# Patient Record
Sex: MALE | Race: WHITE | Employment: FULL TIME | URBAN - METROPOLITAN AREA
[De-identification: names, ages, dates, MRNs, and addresses within clinical notes are randomized per-mention and may not be internally consistent; named-entity substitution may affect disease eponyms.]

---

## 2018-01-30 LAB
T3 FREE: 2.7
T4 FREE: 0.28
T4 FREE: 0.28
T4 TOTAL: 1.8

## 2018-02-27 DIAGNOSIS — E05.90 HYPERTHYROIDISM: ICD-10-CM

## 2018-02-27 DIAGNOSIS — E07.9 THYROID DISEASE: ICD-10-CM

## 2018-02-27 DIAGNOSIS — E04.9 GOITER: ICD-10-CM

## 2018-02-27 DIAGNOSIS — M54.5 LOW BACK PAIN, UNSPECIFIED BACK PAIN LATERALITY, UNSPECIFIED CHRONICITY, WITH SCIATICA PRESENCE UNSPECIFIED: ICD-10-CM

## 2018-02-27 DIAGNOSIS — F31.9 BIPOLAR AFFECTIVE DISORDER, REMISSION STATUS UNSPECIFIED (HCC): ICD-10-CM

## 2018-02-28 ENCOUNTER — OFFICE VISIT (OUTPATIENT)
Dept: ENDOCRINOLOGY | Age: 48
End: 2018-02-28

## 2018-02-28 VITALS
BODY MASS INDEX: 31.95 KG/M2 | HEART RATE: 80 BPM | OXYGEN SATURATION: 95 % | WEIGHT: 249 LBS | HEIGHT: 74 IN | DIASTOLIC BLOOD PRESSURE: 85 MMHG | SYSTOLIC BLOOD PRESSURE: 121 MMHG

## 2018-02-28 DIAGNOSIS — Z77.098 OCCUPATIONAL EXPOSURE TO CHEMICALS: ICD-10-CM

## 2018-02-28 DIAGNOSIS — E04.9 GOITER: Primary | ICD-10-CM

## 2018-02-28 DIAGNOSIS — E05.90 HYPERTHYROIDISM: ICD-10-CM

## 2018-02-28 DIAGNOSIS — R00.2 PALPITATIONS: ICD-10-CM

## 2018-02-28 PROBLEM — Z77.128: Status: ACTIVE | Noted: 2018-02-28

## 2018-02-28 PROCEDURE — 99244 OFF/OP CNSLTJ NEW/EST MOD 40: CPT | Performed by: INTERNAL MEDICINE

## 2018-02-28 RX ORDER — METOPROLOL SUCCINATE 50 MG/1
50 TABLET, EXTENDED RELEASE ORAL DAILY
Qty: 30 TABLET | Refills: 3 | Status: SHIPPED | OUTPATIENT
Start: 2018-02-28 | End: 2018-03-01 | Stop reason: SDUPTHER

## 2018-02-28 RX ORDER — LITHIUM CARBONATE 450 MG
450 TABLET, EXTENDED RELEASE ORAL DAILY
Refills: 2 | COMMUNITY
Start: 2018-01-11 | End: 2022-06-02 | Stop reason: ALTCHOICE

## 2018-02-28 RX ORDER — METHIMAZOLE 5 MG/1
10 TABLET ORAL 3 TIMES DAILY
Refills: 5 | COMMUNITY
Start: 2018-02-17 | End: 2018-02-28 | Stop reason: DRUGHIGH

## 2018-02-28 RX ORDER — METHIMAZOLE 5 MG/1
10 TABLET ORAL DAILY
Qty: 30 TABLET | Refills: 0 | Status: SHIPPED
Start: 2018-02-28 | End: 2018-05-29 | Stop reason: SDUPTHER

## 2018-02-28 RX ORDER — BENZTROPINE MESYLATE 2 MG/1
1 TABLET ORAL 2 TIMES DAILY
COMMUNITY
Start: 2018-02-22 | End: 2022-06-02

## 2018-02-28 RX ORDER — ZIPRASIDONE HYDROCHLORIDE 80 MG/1
80 CAPSULE ORAL DAILY
COMMUNITY
Start: 2018-02-20

## 2018-02-28 ASSESSMENT — PATIENT HEALTH QUESTIONNAIRE - PHQ9
SUM OF ALL RESPONSES TO PHQ9 QUESTIONS 1 & 2: 0
2. FEELING DOWN, DEPRESSED OR HOPELESS: 0
SUM OF ALL RESPONSES TO PHQ QUESTIONS 1-9: 0
1. LITTLE INTEREST OR PLEASURE IN DOING THINGS: 0

## 2018-02-28 NOTE — PROGRESS NOTES
Patient ID: Aleida Parsons is a 56 y.o. male    Chief Complaint: Graves disease, goiter   Referred by Dr. Elsie Woodard MD    HPI:   Aleida Parsons is here for initial evaluation of Graves disease, goiter     US Aug 2014: The right lobe of the thyroid gland measures 5.2 x 2.6 x 2.6 cm in size. The left lobe of the thyroid gland measures 5.1 x 1.9 x 1.5 cm in size. There is a hypoechoic nodule identified at the lower pole of the right lobe of thyroid gland measuring 1.2 x 1.3 x 1.6 cm in size. There is a small 9 mm x 6 mm hypoechoic nodule identified at the lower pole of the left lobe of the thyroid gland. CT neck done Jan 2018 for goiter: Thyroid gland measures 10.5 cm in maximal transverse dimension. The maximal thickness of the isthmus is 2.3 cm. The right lobe of the thyroid gland measures 9.7 x 6.2 x 5.9 cm in size. Left lobe of the thyroid gland measures 9.2 x 5.5 x 4.3 cm in size. No significant mass effect and narrowing of the airway identified. Lithium for a long time became adherent in 2015     Sep 2016, thyroid abs normal     Methimazole 10mg tid   Metoprolol 25mg     Energy levels low sometimes   Occasional palpitations roxann if miss meds   No Hair or skin changes   Lost 35 lbs in about 3-4 months - off seroquel   No heat or cold intolerance   Has depression and anxiety   Denies excessive sweating, tremors, sleep issues   No Neck pain. Some change in voice while talking for a long time   No compressive symptoms     Family history of thyroid disorder: None   No Neck radiation  Recent iodine loading in form of contrast material for diagnostic studies in Jan 2018   No Food supplements, herbs or medications including Biotin  Feb 2018:  Had upper GI symptoms     The following portions of the patient's history were reviewed and updated as appropriate:       Family History   Problem Relation Age of Onset    Arthritis Mother     Cancer Mother      breast    Cancer Father      prostate    High Blood Pressure Father     High Cholesterol Father     Vision Loss Father     Cancer Paternal Grandmother     Cancer Paternal Grandfather     Stroke Paternal Grandfather             Social History     Social History    Marital status:      Spouse name: N/A    Number of children: N/A    Years of education: N/A     Occupational History    Not on file. Social History Main Topics    Smoking status: Current Every Day Smoker     Packs/day: 0.25     Years: 19.00    Smokeless tobacco: Never Used    Alcohol use No    Drug use: Yes     Types: Marijuana    Sexual activity: Yes     Partners: Female     Other Topics Concern    Not on file     Social History Narrative    No narrative on file           Past Medical History:   Diagnosis Date    ADD (attention deficit disorder)     Back pain     [facility] [address]     B[facility] disorder ([facility] [address]     Goiter     Headache(784.0)     Hyperthyroidism     Thyroid disease          No past surgical history on file. No Known Allergies        Current Outpatient Prescriptions:     Lisdexamfetamine Dimesylate (VYVANSE) 40 MG CAPS, Take by mouth., Disp: , Rfl:     lithium (ESKALITH) 450 MG extended release tablet, TAKE 1 TABLET TWICE A DAY, Disp: , Rfl: 2    benztropine (COGENTIN) 2 MG tablet, , Disp: , Rfl:     ziprasidone (GEODON) 80 MG capsule, Per patient maybe 75 mg, Disp: , Rfl:     methimazole (TAPAZOLE) 5 MG tablet, Take 2 tablets by mouth daily, Disp: 30 tablet, Rfl: 0    metoprolol succinate (TOPROL XL) 50 MG extended release tablet, Take 1 tablet by mouth daily, Disp: 30 tablet, Rfl: 3    quetiapine (SEROQUEL) 200 MG tablet, Take 400 mg by mouth nightly., Disp: , Rfl:       Review of Systems:  Constitutional: Negative for fever, chills. HENT: Negative for congestion, ear pain, rhinorrhea,  sore throat and trouble swallowing. Eyes: Negative for photophobia, redness, itching. Respiratory: Negative for cough, shortness of breath and sputum.

## 2018-03-01 ENCOUNTER — HOSPITAL ENCOUNTER (OUTPATIENT)
Dept: ULTRASOUND IMAGING | Age: 48
Discharge: HOME | End: 2018-03-01
Admitting: INTERNAL MEDICINE

## 2018-03-01 DIAGNOSIS — R00.2 PALPITATIONS: ICD-10-CM

## 2018-03-01 DIAGNOSIS — E05.90 HYPERTHYROIDISM: ICD-10-CM

## 2018-03-01 DIAGNOSIS — E04.9 GOITER: ICD-10-CM

## 2018-03-01 RX ORDER — METOPROLOL SUCCINATE 50 MG/1
50 TABLET, EXTENDED RELEASE ORAL DAILY
Qty: 30 TABLET | Refills: 3 | Status: SHIPPED | OUTPATIENT
Start: 2018-03-01 | End: 2018-03-02 | Stop reason: SDUPTHER

## 2018-03-02 ENCOUNTER — TELEPHONE (OUTPATIENT)
Dept: ENDOCRINOLOGY | Age: 48
End: 2018-03-02

## 2018-03-02 DIAGNOSIS — R00.2 PALPITATIONS: ICD-10-CM

## 2018-03-02 DIAGNOSIS — E05.90 HYPERTHYROIDISM: ICD-10-CM

## 2018-03-02 RX ORDER — METOPROLOL SUCCINATE 50 MG/1
50 TABLET, EXTENDED RELEASE ORAL DAILY
Qty: 30 TABLET | Refills: 3 | Status: SHIPPED | OUTPATIENT
Start: 2018-03-02 | End: 2018-05-29

## 2018-03-29 ENCOUNTER — OFFICE VISIT (OUTPATIENT)
Dept: ENDOCRINOLOGY | Age: 48
End: 2018-03-29

## 2018-03-29 VITALS
SYSTOLIC BLOOD PRESSURE: 109 MMHG | WEIGHT: 257 LBS | HEIGHT: 74 IN | DIASTOLIC BLOOD PRESSURE: 73 MMHG | BODY MASS INDEX: 32.98 KG/M2 | OXYGEN SATURATION: 97 % | HEART RATE: 72 BPM

## 2018-03-29 DIAGNOSIS — E05.90 HYPERTHYROIDISM: ICD-10-CM

## 2018-03-29 DIAGNOSIS — E04.9 GOITER: ICD-10-CM

## 2018-03-29 DIAGNOSIS — E07.9 THYROID DISEASE: Primary | ICD-10-CM

## 2018-03-29 PROCEDURE — 99214 OFFICE O/P EST MOD 30 MIN: CPT | Performed by: INTERNAL MEDICINE

## 2018-03-29 ASSESSMENT — PATIENT HEALTH QUESTIONNAIRE - PHQ9
1. LITTLE INTEREST OR PLEASURE IN DOING THINGS: 0
2. FEELING DOWN, DEPRESSED OR HOPELESS: 0
SUM OF ALL RESPONSES TO PHQ QUESTIONS 1-9: 0
SUM OF ALL RESPONSES TO PHQ9 QUESTIONS 1 & 2: 0

## 2018-03-29 NOTE — PROGRESS NOTES
Arthritis Mother     Cancer Mother      breast    Cancer Father      prostate    High Blood Pressure Father     High Cholesterol Father     Vision Loss Father     Cancer Paternal Grandmother     Cancer Paternal Grandfather     Stroke Paternal Grandfather             Social History     Social History    Marital status:      Spouse name: N/A    Number of children: N/A    Years of education: N/A     Occupational History    Not on file. Social History Main Topics    Smoking status: Current Every Day Smoker     Packs/day: 0.25     Years: 19.00    Smokeless tobacco: Never Used    Alcohol use No    Drug use: Yes     Types: Marijuana    Sexual activity: Yes     Partners: Female     Other Topics Concern    Not on file     Social History Narrative    No narrative on file           Past Medical History:   Diagnosis Date    ADD (attention deficit disorder)     Back pain     [facility] disorder ([facility] [address]     Bipolar disorder ([facility] [address]     Goiter     Headache(784.0)     Hyperthyroidism     Thyroid disease          History reviewed. No pertinent surgical history. No Known Allergies        Current Outpatient Prescriptions:     metoprolol succinate (TOPROL XL) 50 MG extended release tablet, Take 1 tablet by mouth daily, Disp: 30 tablet, Rfl: 3    lithium (ESKALITH) 450 MG extended release tablet, TAKE 1 TABLET TWICE A DAY, Disp: , Rfl: 2    benztropine (COGENTIN) 2 MG tablet, nightly , Disp: , Rfl:     ziprasidone (GEODON) 80 MG capsule, Per patient maybe 75 mg, Disp: , Rfl:     methimazole (TAPAZOLE) 5 MG tablet, Take 2 tablets by mouth daily, Disp: 30 tablet, Rfl: 0    Lisdexamfetamine Dimesylate (VYVANSE) 40 MG CAPS, Take by mouth., Disp: , Rfl:       Review of Systems:  Constitutional: Negative for fever, chills. HENT: Negative for congestion, ear pain, rhinorrhea,  sore throat and trouble swallowing. Eyes: Negative for photophobia, redness, itching.    Respiratory: Negative for cough, Blanca Zurita was seen today for goiter. Diagnoses and all orders for this visit:    Thyroid disease  -     TSH without Reflex; Standing  -     T4, Free; Standing  -     T3, Free; Standing  -     CBC Auto Differential; Standing  -     Comprehensive Metabolic Panel; Standing    Hyperthyroidism  -     TSH without Reflex; Standing  -     T4, Free; Standing  -     T3, Free; Standing  -     CBC Auto Differential; Standing  -     Comprehensive Metabolic Panel; Standing    Goiter        1: Hyperthyroidism and Goiter     Jan 2018: TRAB 1.33 (<175), , , Ft4 0.28, T3 104 (normal), Tg and TPO abs normal     High TSH and low Ft4     Methimazole 10mg daily     No uptake scan at this time as he had contrast in Jan 2018     Order uptake scan on next visit     From Aug 2014 till Jan 2018, imaging showed almost double the volume of thyroid gland. It happened after he was taking lithium. So it could be lithium related. Or in rare cases it could be a lymphoma. I would recommend thyroid surgery. Alternative is radio iodine but it may not shrink completely. Risks of thyroid surgery also discussed. He wants to wait for the surgery. C/w metoprolol XL 50mg     Hyperthyroidism medication education   'Reviewed the risks of anti-thyroid drugs including agranulocytosis and hepatitis. If the patient develops a fever, sore throat, jaundice, malaise and/or anorexia, patient is to stop the medicine and call ASAP. Will do trial of methimazole for 9-12 months. If she has flare ups or continued methimazole for longer duration then will consider DURAND or surgery.     TFTs, CMP and CBC today and before next visit in 2 months       Electronically signed by Betty Bailey MD on 3/29/2018 at 4:36 PM

## 2018-03-29 NOTE — PATIENT INSTRUCTIONS
Patient Education        Radioactive Iodine: What to Expect at Home  Your Recovery  Radioactive iodine is absorbed and concentrated by the thyroid gland. You get it in liquid or pill form. The radiation will pass out of your body through your urine within days. Until that time, you will give off radiation in your sweat, your saliva, your urine, and anything else that comes out of your body. It is important to avoid exposing other people to the radioactivity from your body. Your doctor will give you more written instructions. Follow these carefully. The instructions will tell you how far to stay away from people and how long you need to follow the precautions listed below. They will list other ways to keep other people safe. They will also tell you when it will be safe to go out, go to work, and do other activities. How can you care for yourself at home? General recommendations  · For a period of time, you will need to keep your distance from other people, especially young children and pregnant women. · Avoid close contact, kissing, and sexual activity. You may need to sleep in a separate bed from your partner. · Keep the toilet very clean. Men should urinate sitting down to avoid splashing. Flush the toilet 2 or 3 times after each use. Wash your hands well with soap and lots of water each time you use the toilet. · Rinse the bathroom sink and tub well after you use them. · Use separate towels, washcloths, and sheets. Wash these and your personal clothing by themselves. Don't wash them with other people's laundry. · You may want to use a special plastic trash bag for all your trash, such as bandages, paper or plastic dishes, menstrual pads, tissues, or paper towels. Talk to your treatment facility to see if they will handle the disposal. Or after 80 days, this bag can be thrown out with your other trash. · Wash your dishes in a  or by hand.  If you use disposable dishes, they must be thrown away

## 2018-03-30 DIAGNOSIS — E07.9 THYROID DISEASE: ICD-10-CM

## 2018-03-30 DIAGNOSIS — E05.90 HYPERTHYROIDISM: ICD-10-CM

## 2018-03-30 LAB
A/G RATIO: 2.4 (ref 1.1–2.2)
ALBUMIN SERPL-MCNC: 4.1 G/DL (ref 3.4–5)
ALP BLD-CCNC: 65 U/L (ref 40–129)
ALT SERPL-CCNC: 9 U/L (ref 10–40)
ANION GAP SERPL CALCULATED.3IONS-SCNC: 9 MMOL/L (ref 3–16)
AST SERPL-CCNC: 12 U/L (ref 15–37)
BASOPHILS ABSOLUTE: 0 K/UL (ref 0–0.2)
BASOPHILS RELATIVE PERCENT: 0.3 %
BILIRUB SERPL-MCNC: 0.3 MG/DL (ref 0–1)
BUN BLDV-MCNC: 18 MG/DL (ref 7–20)
CALCIUM SERPL-MCNC: 8.8 MG/DL (ref 8.3–10.6)
CHLORIDE BLD-SCNC: 105 MMOL/L (ref 99–110)
CO2: 29 MMOL/L (ref 21–32)
CREAT SERPL-MCNC: 0.9 MG/DL (ref 0.9–1.3)
EOSINOPHILS ABSOLUTE: 0.2 K/UL (ref 0–0.6)
EOSINOPHILS RELATIVE PERCENT: 3 %
GFR AFRICAN AMERICAN: >60
GFR NON-AFRICAN AMERICAN: >60
GLOBULIN: 1.7 G/DL
GLUCOSE BLD-MCNC: 84 MG/DL (ref 70–99)
HCT VFR BLD CALC: 42 % (ref 40.5–52.5)
HEMOGLOBIN: 13.9 G/DL (ref 13.5–17.5)
LYMPHOCYTES ABSOLUTE: 1.4 K/UL (ref 1–5.1)
LYMPHOCYTES RELATIVE PERCENT: 22.9 %
MCH RBC QN AUTO: 32.4 PG (ref 26–34)
MCHC RBC AUTO-ENTMCNC: 33 G/DL (ref 31–36)
MCV RBC AUTO: 98.1 FL (ref 80–100)
MONOCYTES ABSOLUTE: 0.5 K/UL (ref 0–1.3)
MONOCYTES RELATIVE PERCENT: 8.4 %
NEUTROPHILS ABSOLUTE: 4.1 K/UL (ref 1.7–7.7)
NEUTROPHILS RELATIVE PERCENT: 65.4 %
PDW BLD-RTO: 13.8 % (ref 12.4–15.4)
PLATELET # BLD: 165 K/UL (ref 135–450)
PMV BLD AUTO: 9.1 FL (ref 5–10.5)
POTASSIUM SERPL-SCNC: 4.6 MMOL/L (ref 3.5–5.1)
RBC # BLD: 4.28 M/UL (ref 4.2–5.9)
SODIUM BLD-SCNC: 143 MMOL/L (ref 136–145)
T3 FREE: 3.6 PG/ML (ref 2.3–4.2)
T4 FREE: 0.5 NG/DL (ref 0.9–1.8)
TOTAL PROTEIN: 5.8 G/DL (ref 6.4–8.2)
TSH SERPL DL<=0.05 MIU/L-ACNC: 13.62 UIU/ML (ref 0.27–4.2)
WBC # BLD: 6.3 K/UL (ref 4–11)

## 2018-05-25 DIAGNOSIS — E05.90 HYPERTHYROIDISM: ICD-10-CM

## 2018-05-25 DIAGNOSIS — E07.9 THYROID DISEASE: ICD-10-CM

## 2018-05-25 LAB
A/G RATIO: 2.4 (ref 1.1–2.2)
ALBUMIN SERPL-MCNC: 4.3 G/DL (ref 3.4–5)
ALP BLD-CCNC: 67 U/L (ref 40–129)
ALT SERPL-CCNC: 8 U/L (ref 10–40)
ANION GAP SERPL CALCULATED.3IONS-SCNC: 11 MMOL/L (ref 3–16)
AST SERPL-CCNC: 11 U/L (ref 15–37)
BASOPHILS ABSOLUTE: 0 K/UL (ref 0–0.2)
BASOPHILS RELATIVE PERCENT: 0.4 %
BILIRUB SERPL-MCNC: 0.7 MG/DL (ref 0–1)
BUN BLDV-MCNC: 16 MG/DL (ref 7–20)
CALCIUM SERPL-MCNC: 9.3 MG/DL (ref 8.3–10.6)
CHLORIDE BLD-SCNC: 104 MMOL/L (ref 99–110)
CO2: 27 MMOL/L (ref 21–32)
CREAT SERPL-MCNC: 0.9 MG/DL (ref 0.9–1.3)
EOSINOPHILS ABSOLUTE: 0.2 K/UL (ref 0–0.6)
EOSINOPHILS RELATIVE PERCENT: 3.6 %
GFR AFRICAN AMERICAN: >60
GFR NON-AFRICAN AMERICAN: >60
GLOBULIN: 1.8 G/DL
GLUCOSE BLD-MCNC: 109 MG/DL (ref 70–99)
HCT VFR BLD CALC: 44.9 % (ref 40.5–52.5)
HEMOGLOBIN: 15.1 G/DL (ref 13.5–17.5)
LYMPHOCYTES ABSOLUTE: 1.7 K/UL (ref 1–5.1)
LYMPHOCYTES RELATIVE PERCENT: 26.6 %
MCH RBC QN AUTO: 31.9 PG (ref 26–34)
MCHC RBC AUTO-ENTMCNC: 33.7 G/DL (ref 31–36)
MCV RBC AUTO: 94.7 FL (ref 80–100)
MONOCYTES ABSOLUTE: 0.5 K/UL (ref 0–1.3)
MONOCYTES RELATIVE PERCENT: 7.4 %
NEUTROPHILS ABSOLUTE: 4 K/UL (ref 1.7–7.7)
NEUTROPHILS RELATIVE PERCENT: 62 %
PDW BLD-RTO: 13.2 % (ref 12.4–15.4)
PLATELET # BLD: 164 K/UL (ref 135–450)
PMV BLD AUTO: 8.8 FL (ref 5–10.5)
POTASSIUM SERPL-SCNC: 4.8 MMOL/L (ref 3.5–5.1)
RBC # BLD: 4.74 M/UL (ref 4.2–5.9)
SODIUM BLD-SCNC: 142 MMOL/L (ref 136–145)
T3 FREE: 3.2 PG/ML (ref 2.3–4.2)
T4 FREE: 1.1 NG/DL (ref 0.9–1.8)
TOTAL PROTEIN: 6.1 G/DL (ref 6.4–8.2)
TSH SERPL DL<=0.05 MIU/L-ACNC: 3.57 UIU/ML (ref 0.27–4.2)
WBC # BLD: 6.5 K/UL (ref 4–11)

## 2018-05-29 ENCOUNTER — OFFICE VISIT (OUTPATIENT)
Dept: ENDOCRINOLOGY | Age: 48
End: 2018-05-29

## 2018-05-29 VITALS
OXYGEN SATURATION: 96 % | HEIGHT: 74 IN | DIASTOLIC BLOOD PRESSURE: 80 MMHG | HEART RATE: 60 BPM | BODY MASS INDEX: 30.8 KG/M2 | SYSTOLIC BLOOD PRESSURE: 120 MMHG | WEIGHT: 240 LBS

## 2018-05-29 DIAGNOSIS — E05.90 HYPERTHYROIDISM: ICD-10-CM

## 2018-05-29 DIAGNOSIS — E04.9 GOITER: Primary | ICD-10-CM

## 2018-05-29 PROCEDURE — 99214 OFFICE O/P EST MOD 30 MIN: CPT | Performed by: INTERNAL MEDICINE

## 2018-05-29 RX ORDER — METHIMAZOLE 5 MG/1
5 TABLET ORAL DAILY
Qty: 90 TABLET | Refills: 1 | Status: SHIPPED | OUTPATIENT
Start: 2018-05-29 | End: 2019-03-28 | Stop reason: SDUPTHER

## 2018-05-29 RX ORDER — METOPROLOL SUCCINATE 25 MG/1
25 TABLET, EXTENDED RELEASE ORAL DAILY
Qty: 90 TABLET | Refills: 0 | Status: SHIPPED | OUTPATIENT
Start: 2018-05-29 | End: 2018-08-25 | Stop reason: SDUPTHER

## 2018-05-29 ASSESSMENT — PATIENT HEALTH QUESTIONNAIRE - PHQ9
SUM OF ALL RESPONSES TO PHQ9 QUESTIONS 1 & 2: 0
SUM OF ALL RESPONSES TO PHQ QUESTIONS 1-9: 0
1. LITTLE INTEREST OR PLEASURE IN DOING THINGS: 0
2. FEELING DOWN, DEPRESSED OR HOPELESS: 0

## 2018-08-25 DIAGNOSIS — E05.90 HYPERTHYROIDISM: ICD-10-CM

## 2018-08-25 DIAGNOSIS — E04.9 GOITER: ICD-10-CM

## 2018-08-27 DIAGNOSIS — E04.9 GOITER: ICD-10-CM

## 2018-08-27 DIAGNOSIS — E05.90 HYPERTHYROIDISM: ICD-10-CM

## 2018-08-27 LAB
ALBUMIN SERPL-MCNC: 4.2 G/DL (ref 3.4–5)
ALP BLD-CCNC: 68 U/L (ref 40–129)
ALT SERPL-CCNC: 7 U/L (ref 10–40)
AST SERPL-CCNC: 9 U/L (ref 15–37)
BASOPHILS ABSOLUTE: 0 K/UL (ref 0–0.2)
BASOPHILS RELATIVE PERCENT: 0.5 %
BILIRUB SERPL-MCNC: 0.4 MG/DL (ref 0–1)
BILIRUBIN DIRECT: <0.2 MG/DL (ref 0–0.3)
BILIRUBIN, INDIRECT: ABNORMAL MG/DL (ref 0–1)
EOSINOPHILS ABSOLUTE: 0.1 K/UL (ref 0–0.6)
EOSINOPHILS RELATIVE PERCENT: 2.2 %
HCT VFR BLD CALC: 43.3 % (ref 40.5–52.5)
HEMOGLOBIN: 14.3 G/DL (ref 13.5–17.5)
LYMPHOCYTES ABSOLUTE: 1 K/UL (ref 1–5.1)
LYMPHOCYTES RELATIVE PERCENT: 17.5 %
MCH RBC QN AUTO: 31.4 PG (ref 26–34)
MCHC RBC AUTO-ENTMCNC: 33.1 G/DL (ref 31–36)
MCV RBC AUTO: 94.9 FL (ref 80–100)
MONOCYTES ABSOLUTE: 0.3 K/UL (ref 0–1.3)
MONOCYTES RELATIVE PERCENT: 6 %
NEUTROPHILS ABSOLUTE: 4.1 K/UL (ref 1.7–7.7)
NEUTROPHILS RELATIVE PERCENT: 73.8 %
PDW BLD-RTO: 13.9 % (ref 12.4–15.4)
PLATELET # BLD: 171 K/UL (ref 135–450)
PMV BLD AUTO: 8.8 FL (ref 5–10.5)
RBC # BLD: 4.56 M/UL (ref 4.2–5.9)
T3 FREE: 3.1 PG/ML (ref 2.3–4.2)
T4 FREE: 1 NG/DL (ref 0.9–1.8)
TOTAL PROTEIN: 5.9 G/DL (ref 6.4–8.2)
TSH SERPL DL<=0.05 MIU/L-ACNC: 4.94 UIU/ML (ref 0.27–4.2)
WBC # BLD: 5.6 K/UL (ref 4–11)

## 2018-08-27 RX ORDER — METOPROLOL SUCCINATE 25 MG/1
TABLET, EXTENDED RELEASE ORAL
Qty: 90 TABLET | Refills: 0 | Status: SHIPPED | OUTPATIENT
Start: 2018-08-27 | End: 2018-08-29

## 2018-08-29 ENCOUNTER — OFFICE VISIT (OUTPATIENT)
Dept: ENDOCRINOLOGY | Age: 48
End: 2018-08-29

## 2018-08-29 VITALS
BODY MASS INDEX: 30.67 KG/M2 | OXYGEN SATURATION: 96 % | HEIGHT: 74 IN | SYSTOLIC BLOOD PRESSURE: 131 MMHG | DIASTOLIC BLOOD PRESSURE: 87 MMHG | WEIGHT: 239 LBS | HEART RATE: 70 BPM

## 2018-08-29 DIAGNOSIS — E05.90 HYPERTHYROIDISM: Primary | ICD-10-CM

## 2018-08-29 DIAGNOSIS — E04.9 GOITER: ICD-10-CM

## 2018-08-29 PROCEDURE — 99214 OFFICE O/P EST MOD 30 MIN: CPT | Performed by: INTERNAL MEDICINE

## 2018-08-29 ASSESSMENT — PATIENT HEALTH QUESTIONNAIRE - PHQ9
SUM OF ALL RESPONSES TO PHQ QUESTIONS 1-9: 0
2. FEELING DOWN, DEPRESSED OR HOPELESS: 0
SUM OF ALL RESPONSES TO PHQ9 QUESTIONS 1 & 2: 0
SUM OF ALL RESPONSES TO PHQ QUESTIONS 1-9: 0
1. LITTLE INTEREST OR PLEASURE IN DOING THINGS: 0

## 2018-08-29 NOTE — PATIENT INSTRUCTIONS
Patient Education        Thyroid Nodules: Care Instructions  Your Care Instructions  Thyroid nodules are growths or lumps in the thyroid gland. Your thyroid is in the front of your neck. It controls how your body uses energy. You may have tests to see if the nodule is caused by cancer. Most nodules aren't cancer and don't cause problems. Many don't even need treatment. If you do have cancer, it can usually be cured. Treatment will probably include surgery. You may also get radioactive iodine treatment. If your thyroid can't make thyroid hormone after treatment, you can take a pill every day to replace the hormone. Follow-up care is a key part of your treatment and safety. Be sure to make and go to all appointments, and call your doctor if you are having problems. It's also a good idea to know your test results and keep a list of the medicines you take. How can you care for yourself at home? · Be safe with medicines. If you take thyroid hormone medicine:  ¨ Take it exactly as prescribed. Call your doctor if you think you are having a problem with your medicine. If you take the right amount and don't skip doses, you probably won't have side effects. ¨ Do not take it with calcium, vitamins, or iron. ¨ Try not to miss a dose. ¨ Do not take extra doses. This will not help you get better any faster. It may also cause side effects. ¨ Tell your doctor about any medicines you take. This includes over-the-counter medicines. ¨ Wear a medical alert bracelet or necklace that says you take thyroid hormones. You can buy these at most drugstores. When should you call for help? Call 911 anytime you think you may need emergency care.  For example, call if:    · You lose consciousness.    Call your doctor now or seek immediate medical care if:    · You have shortness of breath.    Watch closely for changes in your health, and be sure to contact your doctor if:    · You have pain in your neck, jaw, or ear.     · You have

## 2018-08-29 NOTE — PROGRESS NOTES
Patient ID: Efe Kim is a 53 y.o. male    Chief Complaint: Graves disease, goiter     HPI:   Efe Kim is here for an evaluation of Graves disease, goiter     US Aug 2014: The right lobe of the thyroid gland measures 5.2 x 2.6 x 2.6 cm in size. The left lobe of the thyroid gland measures 5.1 x 1.9 x 1.5 cm in size. There is a hypoechoic nodule identified at the lower pole of the right lobe of thyroid gland measuring 1.2 x 1.3 x 1.6 cm in size. There is a small 9 mm x 6 mm hypoechoic nodule identified at the lower pole of the left lobe of the thyroid gland. CT neck done Jan 2018 for goiter: Thyroid gland measures 10.5 cm in maximal transverse dimension. The maximal thickness of the isthmus is 2.3 cm. The right lobe of the thyroid gland measures 9.7 x 6.2 x 5.9 cm in size. Left lobe of the thyroid gland measures 9.2 x 5.5 x 4.3 cm in size. No significant mass effect and narrowing of the airway identified. US March 2018: Right thyroid lobe: 6.6 x 5.3 x 8.2 cm   Left thyroid lobe:  4.9 x 5.1 x 7.8 cm   Isthmus: 1.6 cm  No nodules. Lithium for a long time became adherent in 2015     Sep 2016, thyroid abs normal   , N <55    Methimazole 5mg daily   Metoprolol ER 25mg daily      Energy levels LOW   No palpitations    No Hair or skin changes   Weight stable   Usually warm   Has depression and anxiety   Denies excessive sweating, tremors, sleep issues   No Neck pain. Some change in voice while talking for a long time   No compressive symptoms     Family history of thyroid disorder: None   No Neck radiation  Recent iodine loading in form of contrast material for diagnostic studies in Jan 2018   No Food supplements, herbs or medications including Biotin  Feb 2018:  Had upper GI symptoms     The following portions of the patient's history were reviewed and updated as appropriate:       Family History   Problem Relation Age of Onset    Arthritis Mother     Cancer Mother         breast    Cancer Father         prostate    High Blood Pressure Father     High Cholesterol Father     Vision Loss Father     Cancer Paternal Grandmother     Cancer Paternal Grandfather     Stroke Paternal Grandfather             Social History     Social History    Marital status:      Spouse name: N/A    Number of children: N/A    Years of education: N/A     Occupational History    Not on file. Social History Main Topics    Smoking status: Current Every Day Smoker     Packs/day: 0.25     Years: 19.00    Smokeless tobacco: Never Used    Alcohol use No    Drug use: Yes     Types: Marijuana    Sexual activity: Yes     Partners: Female     Other Topics Concern    Not on file     Social History Narrative    No narrative on file           Past Medical History:   Diagnosis Date    ADD (attention deficit disorder)     Back pain     [facility] ([facility] [address]     Bipolar disorder ([facility] [address]     Goiter     Headache(784.0)     Hyperthyroidism     Thyroid disease          No past surgical history on file. No Known Allergies        Current Outpatient Prescriptions:     methimazole (TAPAZOLE) 5 MG tablet, Take 1 tablet by mouth daily, Disp: 90 tablet, Rfl: 1    Lisdexamfetamine Dimesylate (VYVANSE) 40 MG CAPS, Take by mouth., Disp: , Rfl:     lithium (ESKALITH) 450 MG extended release tablet, TAKE 1 TABLET TWICE A DAY, Disp: , Rfl: 2    benztropine (COGENTIN) 2 MG tablet, nightly , Disp: , Rfl:     ziprasidone (GEODON) 80 MG capsule, Per patient maybe 75 mg, Disp: , Rfl:       Review of Systems:  Constitutional: Negative for fever, chills. HENT: Negative for congestion, ear pain, rhinorrhea,  sore throat and trouble swallowing. Eyes: Negative for photophobia, redness, itching. Respiratory: Negative for cough, shortness of breath and sputum. Cardiovascular: Negative for chest pain and leg swelling. Gastrointestinal: Negative for nausea, vomiting, abdominal pain, diarrhea, constipation.    Endocrine: Negative for polydipsia, polyphagia and polyuria. Genitourinary: Negative for dysuria, urgency, frequency, hematuria and flank pain. Musculoskeletal: Negative for myalgias, back pain, arthralgias and neck pain. Skin/Nail: Negative for rash, itching. Normal nails. Neurological: Negative for seizures, light-headedness, numbness and headaches. Hematological/ Lymph nodes: Negative for adenopathy. Does not bruise/bleed easily. Psychiatric/Behavioral: Negative for suicidal ideas and decreased concentration. Physical Exam:  /87 (Site: Left Arm, Position: Sitting, Cuff Size: Large Adult)   Pulse 70   Ht 6' 2\" (1.88 m)   Wt 239 lb (108.4 kg)   SpO2 96%   BMI 30.69 kg/m²   Constitutional: Patient is oriented to person, place, and time. Patient appears well-developed and well-nourished. HENT:    Head: Normocephalic and atraumatic. Eyes: Conjunctivae and EOM are normal. Pupils are equal, round, and reactive to light. Neck: Normal range of motion. Thyroid enlarged more on right as compared to left. Stable. Cardiovascular: Normal rate, regular rhythm and normal heart sounds. Pulmonary/Chest: Effort normal and breath sounds normal.   Abdominal: Soft. Bowel sounds are normal.   Musculoskeletal: Normal range of motion. Neurological: Patient is alert and oriented to person, place, and time. Patient has normal reflexes. Skin: Skin is warm and dry. Psychiatric: Patient has a normal mood and affect.  Patient behavior is normal.     Lab Review:    Orders Only on 08/27/2018   Component Date Value Ref Range Status    Total Protein 08/27/2018 5.9* 6.4 - 8.2 g/dL Final    Alb 08/27/2018 4.2  3.4 - 5.0 g/dL Final    Alkaline Phosphatase 08/27/2018 68  40 - 129 U/L Final    ALT 08/27/2018 7* 10 - 40 U/L Final    AST 08/27/2018 9* 15 - 37 U/L Final    Total Bilirubin 08/27/2018 0.4  0.0 - 1.0 mg/dL Final    Bilirubin, Direct 08/27/2018 <0.2  0.0 - 0.3 mg/dL Final    Bilirubin, Indirect Monocytes % 05/25/2018 7.4  % Final    Eosinophils % 05/25/2018 3.6  % Final    Basophils % 05/25/2018 0.4  % Final    Neutrophils # 05/25/2018 4.0  1.7 - 7.7 K/uL Final    Lymphocytes # 05/25/2018 1.7  1.0 - 5.1 K/uL Final    Monocytes # 05/25/2018 0.5  0.0 - 1.3 K/uL Final    Eosinophils # 05/25/2018 0.2  0.0 - 0.6 K/uL Final    Basophils # 05/25/2018 0.0  0.0 - 0.2 K/uL Final    Sodium 05/25/2018 142  136 - 145 mmol/L Final    Potassium 05/25/2018 4.8  3.5 - 5.1 mmol/L Final    Chloride 05/25/2018 104  99 - 110 mmol/L Final    CO2 05/25/2018 27  21 - 32 mmol/L Final    Anion Gap 05/25/2018 11  3 - 16 Final    Glucose 05/25/2018 109* 70 - 99 mg/dL Final    BUN 05/25/2018 16  7 - 20 mg/dL Final    CREATININE 05/25/2018 0.9  0.9 - 1.3 mg/dL Final    GFR Non- 05/25/2018 >60  >60 Final    GFR  05/25/2018 >60  >60 Final    Calcium 05/25/2018 9.3  8.3 - 10.6 mg/dL Final    Total Protein 05/25/2018 6.1* 6.4 - 8.2 g/dL Final    Alb 05/25/2018 4.3  3.4 - 5.0 g/dL Final    Albumin/Globulin Ratio 05/25/2018 2.4* 1.1 - 2.2 Final    Total Bilirubin 05/25/2018 0.7  0.0 - 1.0 mg/dL Final    Alkaline Phosphatase 05/25/2018 67  40 - 129 U/L Final    ALT 05/25/2018 8* 10 - 40 U/L Final    AST 05/25/2018 11* 15 - 37 U/L Final    Globulin 05/25/2018 1.8  g/dL Final   Orders Only on 03/30/2018   Component Date Value Ref Range Status    TSH 03/30/2018 13.62* 0.27 - 4.20 uIU/mL Final    T4 Free 03/30/2018 0.5* 0.9 - 1.8 ng/dL Final    T3, Free 03/30/2018 3.6  2.3 - 4.2 pg/mL Final    WBC 03/30/2018 6.3  4.0 - 11.0 K/uL Final    RBC 03/30/2018 4.28  4.20 - 5.90 M/uL Final    Hemoglobin 03/30/2018 13.9  13.5 - 17.5 g/dL Final    Hematocrit 03/30/2018 42.0  40.5 - 52.5 % Final    MCV 03/30/2018 98.1  80.0 - 100.0 fL Final    MCH 03/30/2018 32.4  26.0 - 34.0 pg Final    MCHC 03/30/2018 33.0  31.0 - 36.0 g/dL Final    RDW 03/30/2018 13.8  12.4 - 15.4 % Final    Platelets 03/30/2018 165  135 - 450 K/uL Final    MPV 03/30/2018 9.1  5.0 - 10.5 fL Final    Neutrophils % 03/30/2018 65.4  % Final    Lymphocytes % 03/30/2018 22.9  % Final    Monocytes % 03/30/2018 8.4  % Final    Eosinophils % 03/30/2018 3.0  % Final    Basophils % 03/30/2018 0.3  % Final    Neutrophils # 03/30/2018 4.1  1.7 - 7.7 K/uL Final    Lymphocytes # 03/30/2018 1.4  1.0 - 5.1 K/uL Final    Monocytes # 03/30/2018 0.5  0.0 - 1.3 K/uL Final    Eosinophils # 03/30/2018 0.2  0.0 - 0.6 K/uL Final    Basophils # 03/30/2018 0.0  0.0 - 0.2 K/uL Final    Sodium 03/30/2018 143  136 - 145 mmol/L Final    Potassium 03/30/2018 4.6  3.5 - 5.1 mmol/L Final    Chloride 03/30/2018 105  99 - 110 mmol/L Final    CO2 03/30/2018 29  21 - 32 mmol/L Final    Anion Gap 03/30/2018 9  3 - 16 Final    Glucose 03/30/2018 84  70 - 99 mg/dL Final    BUN 03/30/2018 18  7 - 20 mg/dL Final    CREATININE 03/30/2018 0.9  0.9 - 1.3 mg/dL Final    GFR Non- 03/30/2018 >60  >60 Final    GFR  03/30/2018 >60  >60 Final    Calcium 03/30/2018 8.8  8.3 - 10.6 mg/dL Final    Total Protein 03/30/2018 5.8* 6.4 - 8.2 g/dL Final    Alb 03/30/2018 4.1  3.4 - 5.0 g/dL Final    Albumin/Globulin Ratio 03/30/2018 2.4* 1.1 - 2.2 Final    Total Bilirubin 03/30/2018 0.3  0.0 - 1.0 mg/dL Final    Alkaline Phosphatase 03/30/2018 65  40 - 129 U/L Final    ALT 03/30/2018 9* 10 - 40 U/L Final    AST 03/30/2018 12* 15 - 37 U/L Final    Globulin 03/30/2018 1.7  g/dL Final   Abstract on 02/27/2018   Component Date Value Ref Range Status    T4 Free 01/30/2018 0.28   Final    T4 Free 01/30/2018 0.28   Final    T4, Total 01/30/2018 1.8   Final    T3, Free 01/30/2018 2.7   Final        No results found. Assessment/Plan:     Victor Manuel Jimenes was seen today for follow-up. Diagnoses and all orders for this visit:    Hyperthyroidism  -     TSH without Reflex;  Future  -     T4, Free; Future  -     T3, Free; Future  -     CBC

## 2018-12-03 DIAGNOSIS — E05.90 HYPERTHYROIDISM: ICD-10-CM

## 2018-12-03 DIAGNOSIS — E04.9 GOITER: ICD-10-CM

## 2018-12-03 LAB
A/G RATIO: 2.3 (ref 1.1–2.2)
ALBUMIN SERPL-MCNC: 4.2 G/DL (ref 3.4–5)
ALP BLD-CCNC: 73 U/L (ref 40–129)
ALT SERPL-CCNC: 8 U/L (ref 10–40)
ANION GAP SERPL CALCULATED.3IONS-SCNC: 13 MMOL/L (ref 3–16)
AST SERPL-CCNC: 10 U/L (ref 15–37)
BASOPHILS ABSOLUTE: 0 K/UL (ref 0–0.2)
BASOPHILS RELATIVE PERCENT: 0.5 %
BILIRUB SERPL-MCNC: 0.5 MG/DL (ref 0–1)
BUN BLDV-MCNC: 15 MG/DL (ref 7–20)
CALCIUM SERPL-MCNC: 9.4 MG/DL (ref 8.3–10.6)
CHLORIDE BLD-SCNC: 106 MMOL/L (ref 99–110)
CO2: 24 MMOL/L (ref 21–32)
CREAT SERPL-MCNC: 1.1 MG/DL (ref 0.9–1.3)
EOSINOPHILS ABSOLUTE: 0.1 K/UL (ref 0–0.6)
EOSINOPHILS RELATIVE PERCENT: 1.6 %
GFR AFRICAN AMERICAN: >60
GFR NON-AFRICAN AMERICAN: >60
GLOBULIN: 1.8 G/DL
GLUCOSE BLD-MCNC: 116 MG/DL (ref 70–99)
HCT VFR BLD CALC: 46 % (ref 40.5–52.5)
HEMOGLOBIN: 14.8 G/DL (ref 13.5–17.5)
LYMPHOCYTES ABSOLUTE: 0.9 K/UL (ref 1–5.1)
LYMPHOCYTES RELATIVE PERCENT: 10.8 %
MCH RBC QN AUTO: 31.4 PG (ref 26–34)
MCHC RBC AUTO-ENTMCNC: 32.2 G/DL (ref 31–36)
MCV RBC AUTO: 97.6 FL (ref 80–100)
MONOCYTES ABSOLUTE: 0.6 K/UL (ref 0–1.3)
MONOCYTES RELATIVE PERCENT: 6.7 %
NEUTROPHILS ABSOLUTE: 6.8 K/UL (ref 1.7–7.7)
NEUTROPHILS RELATIVE PERCENT: 80.4 %
PDW BLD-RTO: 13.8 % (ref 12.4–15.4)
PLATELET # BLD: 168 K/UL (ref 135–450)
PMV BLD AUTO: 9.1 FL (ref 5–10.5)
POTASSIUM SERPL-SCNC: 4.6 MMOL/L (ref 3.5–5.1)
RBC # BLD: 4.71 M/UL (ref 4.2–5.9)
SODIUM BLD-SCNC: 143 MMOL/L (ref 136–145)
T3 FREE: 3 PG/ML (ref 2.3–4.2)
T4 FREE: 1.1 NG/DL (ref 0.9–1.8)
TOTAL PROTEIN: 6 G/DL (ref 6.4–8.2)
TSH SERPL DL<=0.05 MIU/L-ACNC: 3.21 UIU/ML (ref 0.27–4.2)
WBC # BLD: 8.4 K/UL (ref 4–11)

## 2018-12-06 ENCOUNTER — OFFICE VISIT (OUTPATIENT)
Dept: ENDOCRINOLOGY | Age: 48
End: 2018-12-06
Payer: COMMERCIAL

## 2018-12-06 VITALS
BODY MASS INDEX: 30.16 KG/M2 | DIASTOLIC BLOOD PRESSURE: 90 MMHG | HEART RATE: 82 BPM | OXYGEN SATURATION: 97 % | SYSTOLIC BLOOD PRESSURE: 143 MMHG | WEIGHT: 235 LBS | HEIGHT: 74 IN

## 2018-12-06 DIAGNOSIS — E05.90 HYPERTHYROIDISM: Primary | ICD-10-CM

## 2018-12-06 DIAGNOSIS — E04.9 GOITER: ICD-10-CM

## 2018-12-06 PROBLEM — R00.2 PALPITATIONS: Status: RESOLVED | Noted: 2018-02-28 | Resolved: 2018-12-06

## 2018-12-06 PROCEDURE — 99213 OFFICE O/P EST LOW 20 MIN: CPT | Performed by: INTERNAL MEDICINE

## 2018-12-06 NOTE — PROGRESS NOTES
4.2 pg/mL Final    WBC 12/03/2018 8.4  4.0 - 11.0 K/uL Final    RBC 12/03/2018 4.71  4.20 - 5.90 M/uL Final    Hemoglobin 12/03/2018 14.8  13.5 - 17.5 g/dL Final    Hematocrit 12/03/2018 46.0  40.5 - 52.5 % Final    MCV 12/03/2018 97.6  80.0 - 100.0 fL Final    MCH 12/03/2018 31.4  26.0 - 34.0 pg Final    MCHC 12/03/2018 32.2  31.0 - 36.0 g/dL Final    RDW 12/03/2018 13.8  12.4 - 15.4 % Final    Platelets 38/63/9059 168  135 - 450 K/uL Final    MPV 12/03/2018 9.1  5.0 - 10.5 fL Final    Neutrophils % 12/03/2018 80.4  % Final    Lymphocytes % 12/03/2018 10.8  % Final    Monocytes % 12/03/2018 6.7  % Final    Eosinophils % 12/03/2018 1.6  % Final    Basophils % 12/03/2018 0.5  % Final    Neutrophils # 12/03/2018 6.8  1.7 - 7.7 K/uL Final    Lymphocytes # 12/03/2018 0.9* 1.0 - 5.1 K/uL Final    Monocytes # 12/03/2018 0.6  0.0 - 1.3 K/uL Final    Eosinophils # 12/03/2018 0.1  0.0 - 0.6 K/uL Final    Basophils # 12/03/2018 0.0  0.0 - 0.2 K/uL Final    Sodium 12/03/2018 143  136 - 145 mmol/L Final    Potassium 12/03/2018 4.6  3.5 - 5.1 mmol/L Final    Chloride 12/03/2018 106  99 - 110 mmol/L Final    CO2 12/03/2018 24  21 - 32 mmol/L Final    Anion Gap 12/03/2018 13  3 - 16 Final    Glucose 12/03/2018 116* 70 - 99 mg/dL Final    BUN 12/03/2018 15  7 - 20 mg/dL Final    CREATININE 12/03/2018 1.1  0.9 - 1.3 mg/dL Final    GFR Non- 12/03/2018 >60  >60 Final    GFR  12/03/2018 >60  >60 Final    Calcium 12/03/2018 9.4  8.3 - 10.6 mg/dL Final    Total Protein 12/03/2018 6.0* 6.4 - 8.2 g/dL Final    Alb 12/03/2018 4.2  3.4 - 5.0 g/dL Final    Albumin/Globulin Ratio 12/03/2018 2.3* 1.1 - 2.2 Final    Total Bilirubin 12/03/2018 0.5  0.0 - 1.0 mg/dL Final    Alkaline Phosphatase 12/03/2018 73  40 - 129 U/L Final    ALT 12/03/2018 8* 10 - 40 U/L Final    AST 12/03/2018 10* 15 - 37 U/L Final    Globulin 12/03/2018 1.8  g/dL Final   Orders Only on 08/27/2018  Hematocrit 05/25/2018 44.9  40.5 - 52.5 % Final    MCV 05/25/2018 94.7  80.0 - 100.0 fL Final    MCH 05/25/2018 31.9  26.0 - 34.0 pg Final    MCHC 05/25/2018 33.7  31.0 - 36.0 g/dL Final    RDW 05/25/2018 13.2  12.4 - 15.4 % Final    Platelets 90/13/4993 164  135 - 450 K/uL Final    MPV 05/25/2018 8.8  5.0 - 10.5 fL Final    Neutrophils % 05/25/2018 62.0  % Final    Lymphocytes % 05/25/2018 26.6  % Final    Monocytes % 05/25/2018 7.4  % Final    Eosinophils % 05/25/2018 3.6  % Final    Basophils % 05/25/2018 0.4  % Final    Neutrophils # 05/25/2018 4.0  1.7 - 7.7 K/uL Final    Lymphocytes # 05/25/2018 1.7  1.0 - 5.1 K/uL Final    Monocytes # 05/25/2018 0.5  0.0 - 1.3 K/uL Final    Eosinophils # 05/25/2018 0.2  0.0 - 0.6 K/uL Final    Basophils # 05/25/2018 0.0  0.0 - 0.2 K/uL Final    Sodium 05/25/2018 142  136 - 145 mmol/L Final    Potassium 05/25/2018 4.8  3.5 - 5.1 mmol/L Final    Chloride 05/25/2018 104  99 - 110 mmol/L Final    CO2 05/25/2018 27  21 - 32 mmol/L Final    Anion Gap 05/25/2018 11  3 - 16 Final    Glucose 05/25/2018 109* 70 - 99 mg/dL Final    BUN 05/25/2018 16  7 - 20 mg/dL Final    CREATININE 05/25/2018 0.9  0.9 - 1.3 mg/dL Final    GFR Non- 05/25/2018 >60  >60 Final    GFR  05/25/2018 >60  >60 Final    Calcium 05/25/2018 9.3  8.3 - 10.6 mg/dL Final    Total Protein 05/25/2018 6.1* 6.4 - 8.2 g/dL Final    Alb 05/25/2018 4.3  3.4 - 5.0 g/dL Final    Albumin/Globulin Ratio 05/25/2018 2.4* 1.1 - 2.2 Final    Total Bilirubin 05/25/2018 0.7  0.0 - 1.0 mg/dL Final    Alkaline Phosphatase 05/25/2018 67  40 - 129 U/L Final    ALT 05/25/2018 8* 10 - 40 U/L Final    AST 05/25/2018 11* 15 - 37 U/L Final    Globulin 05/25/2018 1.8  g/dL Final   Orders Only on 03/30/2018   Component Date Value Ref Range Status    TSH 03/30/2018 13.62* 0.27 - 4.20 uIU/mL Final    T4 Free 03/30/2018 0.5* 0.9 - 1.8 ng/dL Final    T3, Free 03/30/2018 3.6

## 2019-03-28 DIAGNOSIS — E05.90 HYPERTHYROIDISM: ICD-10-CM

## 2019-03-28 DIAGNOSIS — E04.9 GOITER: ICD-10-CM

## 2019-03-28 RX ORDER — METHIMAZOLE 5 MG/1
TABLET ORAL
Qty: 90 TABLET | Refills: 0 | Status: SHIPPED | OUTPATIENT
Start: 2019-03-28 | End: 2019-06-27 | Stop reason: SDUPTHER

## 2019-04-09 DIAGNOSIS — E05.90 HYPERTHYROIDISM: ICD-10-CM

## 2019-04-09 LAB
T3 FREE: 3.8 PG/ML (ref 2.3–4.2)
T4 FREE: 1.5 NG/DL (ref 0.9–1.8)
TSH SERPL DL<=0.05 MIU/L-ACNC: 1.8 UIU/ML (ref 0.27–4.2)

## 2019-04-11 ENCOUNTER — OFFICE VISIT (OUTPATIENT)
Dept: ENDOCRINOLOGY | Age: 49
End: 2019-04-11
Payer: COMMERCIAL

## 2019-04-11 VITALS
OXYGEN SATURATION: 98 % | SYSTOLIC BLOOD PRESSURE: 120 MMHG | DIASTOLIC BLOOD PRESSURE: 82 MMHG | HEIGHT: 74 IN | WEIGHT: 230 LBS | BODY MASS INDEX: 29.52 KG/M2 | HEART RATE: 82 BPM

## 2019-04-11 DIAGNOSIS — E04.9 GOITER: Primary | ICD-10-CM

## 2019-04-11 DIAGNOSIS — E05.90 HYPERTHYROIDISM: ICD-10-CM

## 2019-04-11 PROCEDURE — 99214 OFFICE O/P EST MOD 30 MIN: CPT | Performed by: INTERNAL MEDICINE

## 2019-04-11 NOTE — PATIENT INSTRUCTIONS
Patient Education        Hyperthyroidism: Care Instructions  Your Care Instructions  Hyperthyroidism occurs when the thyroid gland makes too much thyroid hormone. This speeds up your metabolism--how your body uses energy. This condition can cause you to be very active, lose weight, and have sleep problems, eye problems, and a fast heart rate. It can also cause a goiter. A goiter is an enlarged thyroid gland that you can see at the front of the neck. Hyperthyroidism is often caused by Graves' disease. In Graves' disease, the body's defense (immune) system attacks the thyroid gland. Your doctor may prescribe a beta-blocker medicine to slow your pulse and calm you down. But this is not a treatment for hyperthyroidism. It is given for your fast heart rate. Your doctor may also give you antithyroid medicine. This medicine keeps excess thyroid hormone in check. In some cases, doctors recommend radioactive iodine or surgery to remove the thyroid. After either of these treatments, you may need to take medicine to replace thyroid hormone for the rest of your life. Follow-up care is a key part of your treatment and safety. Be sure to make and go to all appointments, and call your doctor if you are having problems. It's also a good idea to know your test results and keep a list of the medicines you take. How can you care for yourself at home? · Take your medicines exactly as prescribed. You need to take the thyroid medicine at the same time each day. Call your doctor if you think you are having a problem with your medicine. · Graves' disease can make your eyes sore. Use artificial tears, eye drops, and sunglasses to protect your eyes from dryness, wind, and sun. Raise your head with pillows at night to prevent your eyes from swelling. In some cases, taping your eyelids shut at night will keep your eyes from being dry in the morning. · Make sure you get enough calcium.  Foods that are rich in calcium include milk, yogurt, cheese, and dark green vegetables. · If you need to gain weight, ask your doctor about special diets. · Do not eat kelp. [place] is high in iodine, which can make hyperthyroidism worse. [place] is commonly used in sushi and other [place] foods. You can use iodized salt and eat bread and seafood. Try to eat a balanced diet. · Do not use caffeine and other stimulants. These can make symptoms worse, such as a fast heartbeat, nervousness, and problems focusing. · Do not smoke. Smoking can make your condition worse and may lead to more serious eye problems. If you need help quitting, talk to your doctor about stop-smoking programs and medicines. These can increase your chances of quitting for good. · Lower your stress. Learn to use biofeedback, guided imagery, meditation, or other methods to relax. · Use creams or ointments for irritated skin. Ask your doctor which type to use. · Tell all your doctors about your condition. They need to know because some medicines contain iodine. When should you call for help? Call your doctor now or seek immediate medical care if:    · You have symptoms of a sudden, very high thyroid level (thyroid storm). These include:  ? Being nauseated, vomiting, and having diarrhea. ? Sweating a lot. ? Feeling extremely restless and confused. ? Having a high fever. ? Having a fast heartbeat.     · You have sudden vision changes or eye pain.     · You have a fever or severe sore throat and are taking antithyroid medicines, such as PTU or methimazole.    Watch closely for changes in your health, and be sure to contact your doctor if:    · You have a sore throat or have problems swallowing.     · You have swollen, itchy, or red eyes or your other eye symptoms get worse, or you have new vision problems.     · You have signs of a low thyroid level (hypothyroidism). You may feel very tired, confused, or weak. Where can you learn more? Go to [email or web address]://chchapiseb.[email or web address]. org and sign in to your Tiempo account. Enter V328 in the SnapNames box to learn more about \"Hyperthyroidism: Care Instructions. \"     If you do not have an account, please click on the \"Sign Up Now\" link. Current as of: March 14, 2018  Content Version: 11.9  © 6384-5033 [employer]. Care instructions adapted under license by [facility] [employer] [facility] ([facility]). If you have questions about a medical condition or this instruction, always ask your healthcare professional. [place] [address] any warranty or liability for your use of this information. Patient Education        Hyperthyroidism: Care Instructions  Your Care Instructions  Hyperthyroidism occurs when the thyroid gland makes too much thyroid hormone. This speeds up your metabolism--how your body uses energy. This condition can cause you to be very active, lose weight, and have sleep problems, eye problems, and a fast heart rate. It can also cause a goiter. A goiter is an enlarged thyroid gland that you can see at the front of the neck. Hyperthyroidism is often caused by Graves' disease. In Graves' disease, the body's defense (immune) system attacks the thyroid gland. Your doctor may prescribe a beta-blocker medicine to slow your pulse and calm you down. But this is not a treatment for hyperthyroidism. It is given for your fast heart rate. Your doctor may also give you antithyroid medicine. This medicine keeps excess thyroid hormone in check. In some cases, doctors recommend radioactive iodine or surgery to remove the thyroid. After either of these treatments, you may need to take medicine to replace thyroid hormone for the rest of your life. Follow-up care is a key part of your treatment and safety. Be sure to make and go to all appointments, and call your doctor if you are having problems. It's also a good idea to know your test results and keep a list of the medicines you take. How can you care for yourself at home?   · Take your changes or eye pain.     · You have a fever or severe sore throat and are taking antithyroid medicines, such as PTU or methimazole.    Watch closely for changes in your health, and be sure to contact your doctor if:    · You have a sore throat or have problems swallowing.     · You have swollen, itchy, or red eyes or your other eye symptoms get worse, or you have new vision problems.     · You have signs of a low thyroid level (hypothyroidism). You may feel very tired, confused, or weak. Where can you learn more? Go to [email or web address] and sign in to your FatTail account. Enter W927 in the [facility] box to learn more about \"Hyperthyroidism: Care Instructions. \"     If you do not have an account, please click on the \"Sign Up Now\" link. Current as of: March 14, 2018  Content Version: 11.9  © 2649-4257 [employer]. Care instructions adapted under license by [facility] ([facility]). If you have questions about a medical condition or this instruction, always ask your healthcare professional. [place] [address] any warranty or liability for your use of this information.

## 2019-04-11 NOTE — PROGRESS NOTES
Patient ID: Kwasi Escobar is a 51 y.o. male    Chief Complaint: Graves disease, goiter     HPI:   Kwasi Escobar is here for an evaluation of Graves disease, goiter     US Aug 2014: The right lobe of the thyroid gland measures 5.2 x 2.6 x 2.6 cm in size. The left lobe of the thyroid gland measures 5.1 x 1.9 x 1.5 cm in size. There is a hypoechoic nodule identified at the lower pole of the right lobe of thyroid gland measuring 1.2 x 1.3 x 1.6 cm in size. There is a small 9 mm x 6 mm hypoechoic nodule identified at the lower pole of the left lobe of the thyroid gland. CT neck done Jan 2018 for goiter: Thyroid gland measures 10.5 cm in maximal transverse dimension. The maximal thickness of the isthmus is 2.3 cm. The right lobe of the thyroid gland measures 9.7 x 6.2 x 5.9 cm in size. Left lobe of the thyroid gland measures 9.2 x 5.5 x 4.3 cm in size. No significant mass effect and narrowing of the airway identified. [place] March 2018: Right thyroid lobe: 6.6 x 5.3 x 8.2 cm   Left thyroid lobe:  4.9 x 5.1 x 7.8 cm   Isthmus: 1.6 cm  No nodules. Lithium for a long time became adherent in 2015     Sep 2016, thyroid abs normal   , N <55    Methimazole 5mg six days a week     Had dizzy spell yesterday after a long time   Energy levels okay low normal   Palpitations with stress     No Hair or skin changes   Weight loss of 5 lbs since last visit, intentional   Usually warm   Has depression and anxiety   Denies excessive sweating, tremors, sleep issues   No Neck pain. No more change sin voice after prolonged talking. I think he has some hoarseness  No compressive symptoms     Family history of thyroid disorder: None   No Neck radiation  Iodine loading in form of contrast material for diagnostic studies in Jan 2018   No Food supplements, herbs or medications including Biotin  Feb 2018:  Had upper GI symptoms     The following portions of the patient's history were reviewed and updated as appropriate: Family History   Problem Relation Age of Onset    Arthritis Mother     Cancer Mother         breast    Cancer Father         prostate    High Blood Pressure Father     High Cholesterol Father     Vision Loss Father     Cancer Paternal Grandmother     Cancer Paternal Grandfather     Stroke Paternal Grandfather             Social History     Socioeconomic History    Marital status:      Spouse name: Not on file    Number of children: Not on file    Years of education: Not on file    Highest education level: Not on file   Occupational History    Not on file   Social Needs    Financial resource strain: Not on file    Food insecurity:     Worry: Not on file     Inability: Not on file    Transportation needs:     Medical: Not on file     Non-medical: Not on file   Tobacco Use    Smoking status: Former Smoker     Packs/day: 0.25     Years: 19.00     Pack years: 4.75    Smokeless tobacco: Never Used   Substance and Sexual Activity    Alcohol use: No    Drug use: Yes     Types: Marijuana    Sexual activity: Yes     Partners: Female   Lifestyle    Physical activity:     Days per week: Not on file     Minutes per session: Not on file    Stress: Not on file   Relationships    Social connections:     Talks on phone: Not on file     Gets together: Not on file     Attends Nondenominational service: Not on file     Active member of club or organization: Not on file     Attends meetings of clubs or organizations: Not on file     Relationship status: Not on file    Intimate partner violence:     Fear of current or ex partner: Not on file     Emotionally abused: Not on file     Physically abused: Not on file     Forced sexual activity: Not on file   Other Topics Concern    Not on file   Social History Narrative    Not on file           Past Medical History:   Diagnosis Date    ADD (attention deficit disorder)     Back pain     Bipolar disorder ([facility]     Bipolar disorder ([facility]     [place]     Headache(784.0)     Hyperthyroidism     Thyroid disease          History reviewed. No pertinent surgical history. No Known Allergies        Current Outpatient Medications:     methimazole (TAPAZOLE) 5 MG tablet, Change Methimazole 5mg to 6 days a week, skip Sunday, Disp: 90 tablet, Rfl: 0    Lisdexamfetamine Dimesylate (VYVANSE) 40 MG CAPS, Take by mouth., Disp: , Rfl:     lithium (ESKALITH) 450 MG extended release tablet, TAKE 1 TABLET TWICE A DAY, Disp: , Rfl: 2    benztropine (COGENTIN) 2 MG tablet, nightly , Disp: , Rfl:     ziprasidone (GEODON) 80 MG capsule, Take 80 mg by mouth daily , Disp: , Rfl:       Review of Systems:  Constitutional: Negative for fever, chills. HENT: Negative for congestion, ear pain, rhinorrhea,  sore throat and trouble swallowing. Eyes: Negative for photophobia, redness, itching. Respiratory: Negative for cough, shortness of breath and sputum. Cardiovascular: Negative for chest pain and leg swelling. Gastrointestinal: Negative for nausea, vomiting, abdominal pain, diarrhea, constipation. Endocrine: Negative for polydipsia, polyphagia and polyuria. Genitourinary: Negative for dysuria, urgency, frequency, hematuria and flank pain. Musculoskeletal: Negative for myalgias, back pain, arthralgias and neck pain. Skin/Nail: Negative for rash, itching. Normal nails. Neurological: Negative for seizures, light-headedness, numbness and headaches. Hematological/ Lymph nodes: Negative for adenopathy. Does not bruise/bleed easily. Psychiatric/Behavioral: Negative for suicidal ideas and decreased concentration. Physical Exam:  /82 (Site: Left Upper Arm, Position: Sitting, Cuff Size: Large Adult)   Pulse 82   Ht 6' 2\" (1.88 m)   Wt 230 lb (104.3 kg)   SpO2 98%   BMI 29.53 kg/m²   Constitutional: Patient is oriented to person, place, and time. Patient appears well-developed and well-nourished. HENT:    Head: Normocephalic and atraumatic. 0.5  % Final    Neutrophils # 12/03/2018 6.8  1.7 - 7.7 K/uL Final    Lymphocytes # 12/03/2018 0.9* 1.0 - 5.1 K/uL Final    Monocytes # 12/03/2018 0.6  0.0 - 1.3 K/uL Final    Eosinophils # 12/03/2018 0.1  0.0 - 0.6 K/uL Final    Basophils # 12/03/2018 0.0  0.0 - 0.2 K/uL Final    Sodium 12/03/2018 143  136 - 145 mmol/L Final    Potassium 12/03/2018 4.6  3.5 - 5.1 mmol/L Final    Chloride 12/03/2018 106  99 - 110 mmol/L Final    CO2 12/03/2018 24  21 - 32 mmol/L Final    Anion Gap 12/03/2018 13  3 - 16 Final    Glucose 12/03/2018 116* 70 - 99 mg/dL Final    BUN 12/03/2018 15  7 - 20 mg/dL Final    CREATININE 12/03/2018 1.1  0.9 - 1.3 mg/dL Final    GFR Non- 12/03/2018 >60  >60 Final    GFR  12/03/2018 >60  >60 Final    Calcium 12/03/2018 9.4  8.3 - 10.6 mg/dL Final    Total Protein 12/03/2018 6.0* 6.4 - 8.2 g/dL Final    Alb 12/03/2018 4.2  3.4 - 5.0 g/dL Final    Albumin/Globulin Ratio 12/03/2018 2.3* 1.1 - 2.2 Final    Total Bilirubin 12/03/2018 0.5  0.0 - 1.0 mg/dL Final    Alkaline Phosphatase 12/03/2018 73  40 - 129 U/L Final    ALT 12/03/2018 8* 10 - 40 U/L Final    AST 12/03/2018 10* 15 - 37 U/L Final    Globulin 12/03/2018 1.8  g/dL Final   Orders Only on 08/27/2018   Component Date Value Ref Range Status    Total Protein 08/27/2018 5.9* 6.4 - 8.2 g/dL Final    Alb 08/27/2018 4.2  3.4 - 5.0 g/dL Final    Alkaline Phosphatase 08/27/2018 68  40 - 129 U/L Final    ALT 08/27/2018 7* 10 - 40 U/L Final    AST 08/27/2018 9* 15 - 37 U/L Final    Total Bilirubin 08/27/2018 0.4  0.0 - 1.0 mg/dL Final    Bilirubin, Direct 08/27/2018 <0.2  0.0 - 0.3 mg/dL Final    Bilirubin, Indirect 08/27/2018 see below  0.0 - 1.0 mg/dL Final    WBC 08/27/2018 5.6  4.0 - 11.0 K/uL Final    RBC 08/27/2018 4.56  4.20 - 5.90 M/uL Final    Hemoglobin 08/27/2018 14.3  13.5 - 17.5 g/dL Final    Hematocrit 08/27/2018 43.3  40.5 - 52.5 % Final    MCV 08/27/2018 94.9  80.0 - 100.0 fL Final    MCH 08/27/2018 31.4  26.0 - 34.0 pg Final    MCHC 08/27/2018 33.1  31.0 - 36.0 g/dL Final    RDW 08/27/2018 13.9  12.4 - 15.4 % Final    Platelets 06/65/7747 171  135 - 450 K/uL Final    MPV 08/27/2018 8.8  5.0 - 10.5 fL Final    Neutrophils % 08/27/2018 73.8  % Final    Lymphocytes % 08/27/2018 17.5  % Final    Monocytes % 08/27/2018 6.0  % Final    Eosinophils % 08/27/2018 2.2  % Final    Basophils % 08/27/2018 0.5  % Final    Neutrophils # 08/27/2018 4.1  1.7 - 7.7 K/uL Final    Lymphocytes # 08/27/2018 1.0  1.0 - 5.1 K/uL Final    Monocytes # 08/27/2018 0.3  0.0 - 1.3 K/uL Final    Eosinophils # 08/27/2018 0.1  0.0 - 0.6 K/uL Final    Basophils # 08/27/2018 0.0  0.0 - 0.2 K/uL Final    TSH 08/27/2018 4.94* 0.27 - 4.20 uIU/mL Final    T4 Free 08/27/2018 1.0  0.9 - 1.8 ng/dL Final    T3, Free 08/27/2018 3.1  2.3 - 4.2 pg/mL Final   Orders Only on 05/25/2018   Component Date Value Ref Range Status    TSH 05/25/2018 3.57  0.27 - 4.20 uIU/mL Final    T4 Free 05/25/2018 1.1  0.9 - 1.8 ng/dL Final    T3, Free 05/25/2018 3.2  2.3 - 4.2 pg/mL Final    WBC 05/25/2018 6.5  4.0 - 11.0 K/uL Final    RBC 05/25/2018 4.74  4.20 - 5.90 M/uL Final    Hemoglobin 05/25/2018 15.1  13.5 - 17.5 g/dL Final    Hematocrit 05/25/2018 44.9  40.5 - 52.5 % Final    MCV 05/25/2018 94.7  80.0 - 100.0 fL Final    MCH 05/25/2018 31.9  26.0 - 34.0 pg Final    MCHC 05/25/2018 33.7  31.0 - 36.0 g/dL Final    RDW 05/25/2018 13.2  12.4 - 15.4 % Final    Platelets 33/74/5453 164  135 - 450 K/uL Final    MPV 05/25/2018 8.8  5.0 - 10.5 fL Final    Neutrophils % 05/25/2018 62.0  % Final    Lymphocytes % 05/25/2018 26.6  % Final    Monocytes % 05/25/2018 7.4  % Final    Eosinophils % 05/25/2018 3.6  % Final    Basophils % 05/25/2018 0.4  % Final    Neutrophils # 05/25/2018 4.0  1.7 - 7.7 K/uL Final    Lymphocytes # 05/25/2018 1.7  1.0 - 5.1 K/uL Final    Monocytes # 05/25/2018 0.5  0.0 - 1.3 K/uL Final    Eosinophils # 05/25/2018 0.2  0.0 - 0.6 K/uL Final    Basophils # 05/25/2018 0.0  0.0 - 0.2 K/uL Final    Sodium 05/25/2018 142  136 - 145 mmol/L Final    Potassium 05/25/2018 4.8  3.5 - 5.1 mmol/L Final    Chloride 05/25/2018 104  99 - 110 mmol/L Final    CO2 05/25/2018 27  21 - 32 mmol/L Final    Anion Gap 05/25/2018 11  3 - 16 Final    Glucose 05/25/2018 109* 70 - 99 mg/dL Final    BUN 05/25/2018 16  7 - 20 mg/dL Final    CREATININE 05/25/2018 0.9  0.9 - 1.3 mg/dL Final    GFR Non- 05/25/2018 >60  >60 Final    GFR  05/25/2018 >60  >60 Final    Calcium 05/25/2018 9.3  8.3 - 10.6 mg/dL Final    Total Protein 05/25/2018 6.1* 6.4 - 8.2 g/dL Final    Alb 05/25/2018 4.3  3.4 - 5.0 g/dL Final    Albumin/Globulin Ratio 05/25/2018 2.4* 1.1 - 2.2 Final    Total Bilirubin 05/25/2018 0.7  0.0 - 1.0 mg/dL Final    Alkaline Phosphatase 05/25/2018 67  40 - 129 U/L Final    ALT 05/25/2018 8* 10 - 40 U/L Final    AST 05/25/2018 11* 15 - 37 U/L Final    Globulin 05/25/2018 1.8  g/dL Final        No results found. Assessment/Plan:     Guillaume Garcia was seen today for follow-up. Diagnoses and all orders for this visit:    Goiter  -     TSH without Reflex; Future  -     T4, Free; Future  -     T3, Free; Future    Hyperthyroidism  -     TSH without Reflex; Future  -     T4, Free; Future  -     T3, Free; Future        1: Hyperthyroidism and Goiter     Jan 2018: TRAB 1.33 (<175), , , Ft4 0.28, T3 104 (normal), Tg and TPO abs normal     TSH normal, Free levels normal April 2019       C/w Methimazole 5mg 6 days a week, skip Sunday     Discussed radio iodine vs surgery. First option should work as he has hyperthyroidism. He has hig copay plan does not want to do uptake or surgery at this time   Will do uptake scan when he meets deductibles .  Will talk to his financial backer, his dad     From Aug 2014 till Jan 2018, imaging showed almost double the volume of thyroid gland. It happened after he was taking lithium. So it could be lithium related. I would recommend thyroid surgery. Alternative is radio iodine but it may not shrink completely. Risks of thyroid surgery also discussed. He does not want to have surgery or radio iodine. Wants to keep methimazole the same. Hyperthyroidism medication education   'Reviewed the risks of anti-thyroid drugs including agranulocytosis and hepatitis. If the patient develops a fever, sore throat, jaundice, malaise and/or anorexia, patient is to stop the medicine and call ASAP.    2: ED  Still poor after stopping metoprolol   Worried about bills so no blood work at this time     TFTs in 6 months      The total time spent face to face for this encounter was 25  minutes. Greater than 50% of the time was spent in counseling and coordination of care, treatment goals, benefits and risks of the treatment for hyperthyroidism and goiter.        Electronically signed by Ryland Hernandez MD on 4/11/2019 at 4:35 PM

## 2019-10-16 ENCOUNTER — OFFICE VISIT (OUTPATIENT)
Dept: ENDOCRINOLOGY | Age: 49
End: 2019-10-16
Payer: COMMERCIAL

## 2019-10-16 VITALS
DIASTOLIC BLOOD PRESSURE: 94 MMHG | SYSTOLIC BLOOD PRESSURE: 142 MMHG | WEIGHT: 217.8 LBS | BODY MASS INDEX: 27.95 KG/M2 | OXYGEN SATURATION: 99 % | HEART RATE: 84 BPM | HEIGHT: 74 IN

## 2019-10-16 DIAGNOSIS — E04.9 GOITER: Primary | ICD-10-CM

## 2019-10-16 DIAGNOSIS — E05.90 HYPERTHYROIDISM: ICD-10-CM

## 2019-10-16 PROCEDURE — 99213 OFFICE O/P EST LOW 20 MIN: CPT | Performed by: INTERNAL MEDICINE

## 2020-01-22 ENCOUNTER — TELEPHONE (OUTPATIENT)
Dept: ENDOCRINOLOGY | Age: 50
End: 2020-01-22

## 2020-01-22 RX ORDER — METOPROLOL SUCCINATE 25 MG/1
TABLET, EXTENDED RELEASE ORAL
COMMUNITY
Start: 2020-01-21 | End: 2020-03-23 | Stop reason: SDUPTHER

## 2020-03-23 RX ORDER — METOPROLOL SUCCINATE 25 MG/1
25 TABLET, EXTENDED RELEASE ORAL DAILY
Qty: 30 TABLET | Refills: 1 | Status: SHIPPED | OUTPATIENT
Start: 2020-03-23 | End: 2020-05-13

## 2020-04-16 ENCOUNTER — VIRTUAL VISIT (OUTPATIENT)
Dept: ENDOCRINOLOGY | Age: 50
End: 2020-04-16
Payer: COMMERCIAL

## 2020-04-16 PROBLEM — R03.0 BORDERLINE SYSTOLIC HTN: Status: ACTIVE | Noted: 2020-04-16

## 2020-04-16 PROBLEM — E74.39 GLUCOSE INTOLERANCE: Status: ACTIVE | Noted: 2020-04-16

## 2020-04-16 PROCEDURE — 99214 OFFICE O/P EST MOD 30 MIN: CPT | Performed by: INTERNAL MEDICINE

## 2020-04-16 RX ORDER — METHIMAZOLE 5 MG/1
TABLET ORAL
Qty: 90 TABLET | Refills: 0 | Status: SHIPPED | OUTPATIENT
Start: 2020-04-16 | End: 2020-07-23 | Stop reason: SDUPTHER

## 2020-04-16 NOTE — PROGRESS NOTES
Weight gain of 3 lbs as per scale at work   Sometimes cold, but generally feels warm. Keeps fan on every night. Has depression and anxiety   Denies excessive sweating, tremors, sleep issues   No Neck pain. No more changes in voice after prolonged talking.    Mild stable hoarseness  No compressive symptoms     Family history of thyroid disorder: None   No Neck radiation  Iodine loading in form of contrast material for diagnostic studies in Jan 2018   No Food supplements, herbs or medications including Biotin  No recent URI     The following portions of the patient's history were reviewed and updated as appropriate:       Family History   Problem Relation Age of Onset    Arthritis Mother     Cancer Mother         breast    Cancer Father         prostate    High Blood Pressure Father     High Cholesterol Father     Vision Loss Father     Cancer Paternal Grandmother     Cancer Paternal Grandfather     Stroke Paternal Grandfather             Social History     Socioeconomic History    Marital status:      Spouse name: Not on file    Number of children: Not on file    Years of education: Not on file    Highest education level: Not on file   Occupational History    Not on file   Social Needs    Financial resource strain: Not on file    Food insecurity     Worry: Not on file     Inability: Not on file    Transportation needs     Medical: Not on file     Non-medical: Not on file   Tobacco Use    Smoking status: Former Smoker     Packs/day: 0.25     Years: 19.00     Pack years: 4.75    Smokeless tobacco: Never Used   Substance and Sexual Activity    Alcohol use: No    Drug use: Yes     Types: Marijuana    Sexual activity: Yes     Partners: Female   Lifestyle    Physical activity     Days per week: Not on file     Minutes per session: Not on file    Stress: Not on file   Relationships    Social connections     Talks on phone: Not on file     Gets together: Not on file     Attends [employer] for myalgias, back pain, arthralgias and neck pain. Skin/Nail: Negative for rash, itching. Normal nails. Neurological: Negative for seizures, light-headedness, numbness and headaches. Hematological/ Lymph nodes: Negative for adenopathy. Does not bruise/bleed easily. Psychiatric/Behavioral: Negative for suicidal ideas and decreased concentration. Physical Exam:  There were no vitals taken for this visit. Constitutional: Patient is oriented to person, place, and time. Patient appears well-developed and well-nourished. Pulmonary/Chest: Effort normal    Neurological: Patient is alert and oriented to person, place, and time. Psychiatric: Patient has a normal mood and affect.  Patient behavior is normal.     Lab Review:    Orders Only on 04/07/2020   Component Date Value Ref Range Status    Sodium 04/07/2020 138  135 - 145 mEq/L Final    Potassium 04/07/2020 4.1  3.6 - 5.1 mEq/L Final    Chloride 04/07/2020 107  101 - 111 mEq/L Final    CO2 04/07/2020 26  24 - 36 mmol/L Final    Glucose 04/07/2020 119* 70 - 99 mg/dL Final    BUN 04/07/2020 18  8 - 26 mg/dL Final    CREATININE 04/07/2020 0.99  0.64 - 1.27 mg/dL Final    Calcium 04/07/2020 9.2  8.5 - 10.5 mg/dL Final    Total Protein 04/07/2020 6.4  6.0 - 8.0 g/dL Final    Alb 04/07/2020 4.2  3.5 - 5.0 g/dL Final    Total Bilirubin 04/07/2020 0.9  0.0 - 1.2 mg/dL Final    Alkaline Phosphatase 04/07/2020 55  35 - 135 IU/L Final    AST 04/07/2020 16  10 - 40 IU/L Final    ALT 04/07/2020 14  10 - 60 IU/L Final    GFR Non- 04/07/2020 88  >59 mL/min/1.73 m2 Final    GFR  04/07/2020 101  >59 mL/min/1.73 m2 Final    Anion Gap 04/07/2020 5* 6 - 18 mmol/L Final    T4 Free 04/07/2020 1.23  0.80 - 2.00 ng/dL Final    TSH 04/07/2020 3.290  0.270 - 4.200 mIU/L Final    T3, Free 04/07/2020 3.70  2.00 - 4.40 pg/mL Final   Orders Only on 04/07/2020   Component Date Value Ref Range Status    WBC 04/07/2020 4.0 vs surgery. First option should work as he has hyperthyroidism. He has hig copay plan does not want to do uptake or surgery at this time   Will do uptake scan when he meets deductibles . Will talk to his financial backer, his dad     From Aug 2014 till Jan 2018, imaging showed almost double the volume of thyroid gland. It happened after he was taking lithium. Increase in thyroid size likely Lithium related. I would recommend thyroid surgery. Alternative is radio iodine but it may not shrink completely. Risks of thyroid surgery also discussed. He does not want to have surgery or radio iodine. Wants to keep methimazole the same. Hyperthyroidism medication education   'Reviewed the risks of anti-thyroid drugs including agranulocytosis and hepatitis.  If the patient develops a fever, sore throat, jaundice, malaise and/or anorexia, patient is to stop the medicine and call ASAP.    2: ED  Still poor after stopping metoprolol   Worried about bills so no blood work at this time     3: HTN   142/94 on last visit   Cu down salt, processed food   He is been told to have normal BP at work     4: Palpitations with dizziness   Unlikely thyroid related since thyroid levels are normal   Refer to EP     TFTs, CBC, CMP, A1C in 3 months       Electronically signed by Guera Arrington MD on 4/16/2020 at 4:34 PM

## 2020-04-16 NOTE — PATIENT INSTRUCTIONS
Patient Education        Goiter: Care Instructions  Your Care Instructions    A goiter is an enlarged thyroid gland. It can cause swelling in your neck. Your thyroid is found in the front of your neck. It makes a hormone that controls how your body uses energy. Goiters are caused by different things. Some are caused by high or low levels of thyroid hormone. Others are caused by too little iodine in the diet, or a growth or disease in the thyroid. In the [place], most goiters are caused by long-term autoimmune thyroiditis. This is also called Hashimoto's thyroiditis. It happens when the body's immune system damages the thyroid. You may take thyroid hormone to reduce the size of your goiter. Or you may need surgery or radioactive iodine treatment. Some people don't need any treatment. They only need to watch for changes in the goiter. Follow-up care is a key part of your treatment and safety. Be sure to make and go to all appointments, and call your doctor if you are having problems. It's also a good idea to know your test results and keep a list of the medicines you take. How can you care for yourself at home? · Be safe with medicines. Take your medicines exactly as prescribed. Call your doctor if you think you are having a problem with your medicine. You will get more details on the specific medicines your doctor prescribes. When should you call for help? Call 911 anytime you think you may need emergency care. For example, call if:    · You have trouble breathing.    Watch closely for changes in your health, and be sure to contact your doctor if:    · Your eyes turn red and bulge.     · You have trouble swallowing.     · You feel very tired or weak.     · You lose weight but are eating the same or more than usual.   Where can you learn more? Go to [email or web address] org and sign in to your [employer] account.  Enter M943 in the [employer] box to learn more about \"Goiter: Care Instructions. \"     If you do not have an account, please click on the \"Sign Up Now\" link. Current as of: July 28, 2019Content Version: 12.4  © 2627-8057 Healthwise, Incorporated. Care instructions adapted under license by [facility] [place] ([facility]). If you have questions about a medical condition or this instruction, always ask your healthcare professional. [address] any warranty or liability for your use of this information.

## 2020-05-13 RX ORDER — METOPROLOL SUCCINATE 25 MG/1
TABLET, EXTENDED RELEASE ORAL
Qty: 30 TABLET | Refills: 1 | Status: SHIPPED | OUTPATIENT
Start: 2020-05-13 | End: 2020-08-28

## 2020-05-27 RX ORDER — METHIMAZOLE 5 MG/1
TABLET ORAL
Qty: 78 TABLET | Refills: 1 | OUTPATIENT
Start: 2020-05-27

## 2020-07-03 ENCOUNTER — TELEPHONE (OUTPATIENT)
Dept: ENDOCRINOLOGY | Age: 50
End: 2020-07-03

## 2020-07-03 NOTE — TELEPHONE ENCOUNTER
Patient called to get a refill on metoprolol. He is not sure if he has refills in the pharmacy or not he just wanted to check with us first he was advised to call his pharmacy I also reviewed his most recent thyroid function test which are now in the normal range he continues to take his Tapazole. He was advised to call the office on Monday and see if he still needs to continue taking metoprolol. He was advised to not stop metoprolol. He will most likely taper it off after discussing with Dr. Roque Booker.   It appears that his prescription was well done last month and he should have refills

## 2020-07-13 RX ORDER — METHIMAZOLE 5 MG/1
TABLET ORAL
Qty: 90 TABLET | Refills: 0 | OUTPATIENT
Start: 2020-07-13

## 2020-07-13 NOTE — TELEPHONE ENCOUNTER
Requested Prescriptions     Pending Prescriptions Disp Refills    methIMAzole (TAPAZOLE) 5 MG tablet [Pharmacy Med Name: METHIMAZOLE 5 MG TABLET] 90 tablet 0     Sig: TAKE 1 TABLET BY MOUTH EVERY DAY       Last OV 4/16/20  Next OV 7/23/20

## 2020-07-23 ENCOUNTER — OFFICE VISIT (OUTPATIENT)
Dept: ENDOCRINOLOGY | Age: 50
End: 2020-07-23
Payer: COMMERCIAL

## 2020-07-23 VITALS
WEIGHT: 221.6 LBS | BODY MASS INDEX: 28.44 KG/M2 | SYSTOLIC BLOOD PRESSURE: 144 MMHG | HEIGHT: 74 IN | DIASTOLIC BLOOD PRESSURE: 93 MMHG | HEART RATE: 65 BPM | TEMPERATURE: 97.5 F | OXYGEN SATURATION: 96 %

## 2020-07-23 PROBLEM — R68.82 LOW LIBIDO: Status: ACTIVE | Noted: 2020-07-23

## 2020-07-23 PROCEDURE — 99214 OFFICE O/P EST MOD 30 MIN: CPT | Performed by: INTERNAL MEDICINE

## 2020-07-23 RX ORDER — METHIMAZOLE 5 MG/1
TABLET ORAL
Qty: 90 TABLET | Refills: 0 | Status: SHIPPED | OUTPATIENT
Start: 2020-07-23 | End: 2020-10-26

## 2020-07-23 NOTE — PATIENT INSTRUCTIONS
Patient Education        Hyperthyroidism: Care Instructions  Your Care Instructions  Hyperthyroidism occurs when the thyroid gland makes too much thyroid hormone. This speeds up your metabolism--how your body uses energy. This condition can cause you to be very active, lose weight, and have sleep problems, eye problems, and a fast heart rate. It can also cause a goiter. A goiter is an enlarged thyroid gland that you can see at the front of the neck. Hyperthyroidism is often caused by Graves' disease. In Graves' disease, the body's defense (immune) system attacks the thyroid gland. Your doctor may prescribe a beta-blocker medicine to slow your pulse and calm you down. But this is not a treatment for hyperthyroidism. It is given for your fast heart rate. Your doctor may also give you antithyroid medicine. This medicine keeps excess thyroid hormone in check. In some cases, doctors recommend radioactive iodine or surgery to remove the thyroid. After either of these treatments, you may need to take medicine to replace thyroid hormone for the rest of your life. Follow-up care is a key part of your treatment and safety. Be sure to make and go to all appointments, and call your doctor if you are having problems. It's also a good idea to know your test results and keep a list of the medicines you take. How can you care for yourself at home? · Take your medicines exactly as prescribed. You need to take the thyroid medicine at the same time each day. Call your doctor if you think you are having a problem with your medicine. · Graves' disease can make your eyes sore. Use artificial tears, eye drops, and sunglasses to protect your eyes from dryness, wind, and sun. Raise your head with pillows at night to prevent your eyes from swelling. In some cases, taping your eyelids shut at night will keep your eyes from being dry in the morning. · Make sure you get enough calcium.  Foods that are rich in calcium include milk, yogurt, cheese, and dark green vegetables. · If you need to gain weight, ask your doctor about special diets. · Do not eat kelp. Gabby Jordan is high in iodine, which can make hyperthyroidism worse. Gabby Jordan is commonly used in [employer] and other [place] foods. You can use iodized salt and eat bread and seafood. Try to eat a balanced diet. · Do not use caffeine and other stimulants. These can make symptoms worse, such as a fast heartbeat, nervousness, and problems focusing. · Do not smoke. Smoking can make your condition worse and may lead to more serious eye problems. If you need help quitting, talk to your doctor about stop-smoking programs and medicines. These can increase your chances of quitting for good. · Lower your stress. Learn to use biofeedback, guided imagery, meditation, or other methods to relax. · Use creams or ointments for irritated skin. Ask your doctor which type to use. · Tell all your doctors about your condition. They need to know because some medicines contain iodine. When should you call for help? Call your doctor now or seek immediate medical care if:  · You have symptoms of a sudden, very high thyroid level (thyroid storm). These include:  ? Being nauseated, vomiting, and having diarrhea. ? Sweating a lot. ? Feeling extremely restless and confused. ? Having a high fever. ? Having a fast heartbeat. · You have sudden vision changes or eye pain. · You have a fever or severe sore throat and are taking antithyroid medicines, such as PTU or methimazole. Watch closely for changes in your health, and be sure to contact your doctor if:  · You have a sore throat or have problems swallowing. · You have swollen, itchy, or red eyes or your other eye symptoms get worse, or you have new vision problems. · You have signs of a low thyroid level (hypothyroidism). You may feel very tired, confused, or weak. Where can you learn more? Go to [email or web address]://chchapiseb.[email or web address] org and sign in to your [employer]ola account. Enter U001 in the [facility] box to learn more about \"Hyperthyroidism: Care Instructions. \"     If you do not have an account, please click on the \"Sign Up Now\" link. Current as of: July 29, 2019               Content Version: 12.5  © 9815-4013 Healthwise, Incorporated. Care instructions adapted under license by [facility] ([facility]). If you have questions about a medical condition or this instruction, always ask your healthcare professional. [address] any warranty or liability for your use of this information.

## 2020-07-23 NOTE — PROGRESS NOTES
disorder)     Back pain     Bipolar disorder ([facility] [address]     Bipolar disorder ([facility] [address]     Goiter     Headache(784.0)     Hyperthyroidism     Thyroid disease          History reviewed. No pertinent surgical history. No Known Allergies        Current Outpatient Medications:     methIMAzole (TAPAZOLE) 5 MG tablet, Methimazole 5mg daily, Disp: 90 tablet, Rfl: 0    metoprolol succinate (TOPROL XL) 25 MG extended release tablet, TAKE 1 TABLET BY MOUTH EVERY DAY, Disp: 30 tablet, Rfl: 1    lithium (ESKALITH) 450 MG extended release tablet, TAKE 1 TABLET TWICE A DAY, Disp: , Rfl: 2    benztropine (COGENTIN) 2 MG tablet, 1 mg 2 times daily , Disp: , Rfl:     ziprasidone (GEODON) 80 MG capsule, Take 80 mg by mouth daily , Disp: , Rfl:       Review of Systems:  Constitutional: Negative for fever, chills. HENT: Negative for congestion, ear pain, rhinorrhea,  sore throat and trouble swallowing. Eyes: Negative for photophobia, redness, itching. Respiratory: Negative for cough, shortness of breath and sputum. Cardiovascular: Negative for chest pain and leg swelling. Gastrointestinal: Negative for nausea, vomiting, abdominal pain, diarrhea, constipation. Endocrine: Negative for polydipsia, polyphagia and polyuria. Genitourinary: Negative for dysuria, urgency, frequency, hematuria and flank pain. Musculoskeletal: Negative for myalgias, back pain, arthralgias and neck pain. Skin/Nail: Negative for rash, itching. Normal nails. Neurological: Negative for seizures, light-headedness, numbness and headaches. Hematological/ Lymph nodes: Negative for adenopathy. Does not bruise/bleed easily. Psychiatric/Behavioral: Negative for suicidal ideas and decreased concentration.           Physical Exam:  BP (!) 144/93 (Site: Right Upper Arm, Position: Sitting, Cuff Size: Large Adult)   Pulse 65   Temp 97.5 °F (36.4 °C) (Temporal)   Ht 6' 2\" (1.88 m)   Wt 221 lb 9.6 oz (100.5 kg)   SpO2 96%   BMI 28.45 kg/m² Constitutional: Patient is oriented to person, place, and time. Patient appears well-developed and well-nourished. HENT:               Head: Normocephalic and atraumatic. Eyes: Conjunctivae and EOM are normal.  .               Neck: Normal range of motion. Thyroid enlarged more on right as compared to left. Cardiovascular: Normal rate, regular rhythm and normal heart sounds. Pulmonary/Chest: Effort normal and breath sounds normal.   Abdominal: Soft. Bowel sounds are normal.   Musculoskeletal: Normal range of motion. Neurological: Patient is alert and oriented to person, place, and time. Patient has normal reflexes. Skin: Skin is warm and dry. Psychiatric: Patient has a normal mood and affect.  Patient behavior is normal.        Lab Review:    Orders Only on 07/14/2020   Component Date Value Ref Range Status    Sodium 07/14/2020 140  135 - 145 mEq/L Final    Potassium 07/14/2020 3.9  3.6 - 5.1 mEq/L Final    Chloride 07/14/2020 108  101 - 111 mEq/L Final    CO2 07/14/2020 25  24 - 36 mmol/L Final    Glucose 07/14/2020 90  70 - 99 mg/dL Final    BUN 07/14/2020 20  8 - 26 mg/dL Final    CREATININE 07/14/2020 1.10  0.64 - 1.27 mg/dL Final    Calcium 07/14/2020 9.4  8.5 - 10.5 mg/dL Final    Total Protein 07/14/2020 6.4  6.0 - 8.0 g/dL Final    Alb 07/14/2020 4.1  3.5 - 5.0 g/dL Final    Total Bilirubin 07/14/2020 1.0  0.0 - 1.2 mg/dL Final    Alkaline Phosphatase 07/14/2020 62  35 - 135 IU/L Final    AST 07/14/2020 16  10 - 40 IU/L Final    ALT 07/14/2020 16  10 - 60 IU/L Final    GFR Non- 07/14/2020 77  >59 mL/min/1.73 m2 Final    GFR  07/14/2020 89  >59 mL/min/1.73 m2 Final    Anion Gap 07/14/2020 7  6 - 18 mmol/L Final    Hemoglobin A1C 07/14/2020 4.9  4.2 - 5.6 % Final    eAG 07/14/2020 94  mg/dL Final    T3, Free 07/14/2020 3.62  2.00 - 4.40 pg/mL Final    TSH 07/14/2020 2.010  0.270 - 4.200 mIU/L Final    T4 Free 07/14/2020 1.31 0.80 - 2.00 ng/dL Final   Orders Only on 04/07/2020   Component Date Value Ref Range Status    Sodium 04/07/2020 138  135 - 145 mEq/L Final    Potassium 04/07/2020 4.1  3.6 - 5.1 mEq/L Final    Chloride 04/07/2020 107  101 - 111 mEq/L Final    CO2 04/07/2020 26  24 - 36 mmol/L Final    Glucose 04/07/2020 119* 70 - 99 mg/dL Final    BUN 04/07/2020 18  8 - 26 mg/dL Final    CREATININE 04/07/2020 0.99  0.64 - 1.27 mg/dL Final    Calcium 04/07/2020 9.2  8.5 - 10.5 mg/dL Final    Total Protein 04/07/2020 6.4  6.0 - 8.0 g/dL Final    Alb 04/07/2020 4.2  3.5 - 5.0 g/dL Final    Total Bilirubin 04/07/2020 0.9  0.0 - 1.2 mg/dL Final    Alkaline Phosphatase 04/07/2020 55  35 - 135 IU/L Final    AST 04/07/2020 16  10 - 40 IU/L Final    ALT 04/07/2020 14  10 - 60 IU/L Final    GFR Non- 04/07/2020 88  >59 mL/min/1.73 m2 Final    GFR  04/07/2020 101  >59 mL/min/1.73 m2 Final    Anion Gap 04/07/2020 5* 6 - 18 mmol/L Final    T4 Free 04/07/2020 1.23  0.80 - 2.00 ng/dL Final    TSH 04/07/2020 3.290  0.270 - 4.200 mIU/L Final    T3, Free 04/07/2020 3.70  2.00 - 4.40 pg/mL Final   Orders Only on 04/07/2020   Component Date Value Ref Range Status    WBC 04/07/2020 4.0  3.6 - 10.5 THOU/mcL Final    RBC 04/07/2020 4.65  4.40 - 5.80 MIL/mcL Final    Hemoglobin 04/07/2020 14.7  13.5 - 16.5 g/dL Final    Hematocrit 04/07/2020 44.5  40 - 50 % Final    MCV 04/07/2020 95.7  82 - 97 fL Final    MCH 04/07/2020 31.7  27 - 33 pg Final    MCHC 04/07/2020 33.1  32 - 36 g/dL Final    RDW 04/07/2020 13.6  12.3 - 17.0 % Final    Platelets 11/99/0121 179  140 - 375 THOU/mcL Final    MPV 04/07/2020 8.2  7.4 - 11.5 fL Final    Neutrophils Absolute 04/07/2020 2.30  1.80 - 7.70 THOU/mcL Final    Lymphocytes Absolute 04/07/2020 1.20  1.00 - 4.00 THOU/mcL Final    Monocytes Absolute 04/07/2020 0.30  0.20 - 0.90 THOU/mcL Final    Eosinophils Absolute 04/07/2020 0.20  0.03 - 0.45 THOU/mcL Final  Basophils Absolute 04/07/2020 0.00  0.00 - 0.20 THOU/mcL Final    Seg Neutrophils 04/07/2020 58  % Final    Lymphocytes % 04/07/2020 30  % Final    Monocytes 04/07/2020 8  % Final    Eosinophils % 04/07/2020 4  % Final    Basophils Absolute 04/07/2020 0  % Final        No results found. Assessment/Plan:     Shen Arthur was seen today for follow-up and thyroid problem. Diagnoses and all orders for this visit:    Hyperthyroidism  -     TSH without Reflex; Future  -     T4, Free; Future  -     T3, Free; Future  -     methIMAzole (TAPAZOLE) 5 MG tablet; Methimazole 5mg daily    Goiter  -     methIMAzole (TAPAZOLE) 5 MG tablet; Methimazole 5mg daily    Borderline systolic HTN    Palpitations    Low libido  -     Testosterone, free, total; Future        1: Hyperthyroidism and Goiter     Jan 2018: TRAB 1.33 (<175), , Tg and TPO abs normal     TFTs normal July 2020     C/w Methimazole 5mg one daily    Keep metoprol half tab of 25mg daily     Discussed radio iodine vs surgery. First option should work as he has hyperthyroidism. He has hig copay plan does not want to do uptake or surgery at this time   Will do uptake scan when he meets deductibles . Will talk to his financial backer, his dad     From Aug 2014 till Jan 2018, imaging showed almost double the volume of thyroid gland. It happened after he was taking lithium. Increase in thyroid size likely Lithium related. I would recommend thyroid surgery. Alternative is radio iodine but it may not shrink completely. Risks of thyroid surgery also discussed. He does not want to have surgery or radio iodine. Wants to keep methimazole the same. Hyperthyroidism medication education   'Reviewed the risks of anti-thyroid drugs including agranulocytosis and hepatitis.  If the patient develops a fever, sore throat, jaundice, malaise and/or anorexia, patient is to stop the medicine and call ASAP.    2: ED  Still poor after stopping metoprolol   Worried about bills so no blood work at this time     3: HTN   Slightly high   Cut down salt, processed food   He is been told to have normal BP at work     4: Palpitations with dizziness   Saw Cards at [address] [place] [address] Drive since off vyvanse   He will follow wit pcp about how to replace it since he is tired     5: Low libido   Check levels     TFTs, TT  in 4 months       Electronically signed by Yumiko Etienne MD on 7/23/2020 at 4:01 PM

## 2020-08-28 RX ORDER — METOPROLOL SUCCINATE 25 MG/1
TABLET, EXTENDED RELEASE ORAL
Qty: 30 TABLET | Refills: 1 | Status: SHIPPED | OUTPATIENT
Start: 2020-08-28 | End: 2020-12-10

## 2020-10-22 ENCOUNTER — OFFICE VISIT (OUTPATIENT)
Dept: ENDOCRINOLOGY | Age: 50
End: 2020-10-22
Payer: COMMERCIAL

## 2020-10-22 VITALS
TEMPERATURE: 97.8 F | SYSTOLIC BLOOD PRESSURE: 136 MMHG | DIASTOLIC BLOOD PRESSURE: 84 MMHG | OXYGEN SATURATION: 97 % | HEIGHT: 74 IN | BODY MASS INDEX: 28.54 KG/M2 | HEART RATE: 70 BPM | WEIGHT: 222.4 LBS

## 2020-10-22 DIAGNOSIS — E05.90 HYPERTHYROIDISM: ICD-10-CM

## 2020-10-22 LAB
T3 FREE: 3.7 PG/ML (ref 2.3–4.2)
T4 FREE: 1.4 NG/DL (ref 0.9–1.8)
TSH SERPL DL<=0.05 MIU/L-ACNC: 1.52 UIU/ML (ref 0.27–4.2)

## 2020-10-22 PROCEDURE — 99214 OFFICE O/P EST MOD 30 MIN: CPT | Performed by: INTERNAL MEDICINE

## 2020-10-22 RX ORDER — LOSARTAN POTASSIUM 50 MG/1
25 TABLET ORAL DAILY
COMMUNITY
Start: 2020-09-16

## 2020-10-22 RX ORDER — LISDEXAMFETAMINE DIMESYLATE 20 MG/1
CAPSULE ORAL
COMMUNITY
Start: 2020-09-24 | End: 2021-05-20

## 2020-10-22 RX ORDER — DICLOFENAC SODIUM 75 MG/1
TABLET, DELAYED RELEASE ORAL
COMMUNITY
Start: 2020-10-15 | End: 2020-10-22

## 2020-10-22 NOTE — PROGRESS NOTES
Patient ID: Deni Worthington is a 53 y.o. male    Chief Complaint: Graves disease, goiter     HPI:   Deni Worthington is here for an evaluation of Graves disease, goiter     US Aug 2014: The right lobe of the thyroid gland measures 5.2 x 2.6 x 2.6 cm in size. The left lobe of the thyroid gland measures 5.1 x 1.9 x 1.5 cm in size. There is a hypoechoic nodule identified at the lower pole of the right lobe of thyroid gland measuring 1.2 x 1.3 x 1.6 cm in size. There is a small 9 mm x 6 mm hypoechoic nodule identified at the lower pole of the left lobe of the thyroid gland. CT neck done Jan 2018 for goiter: Thyroid gland measures 10.5 cm in maximal transverse dimension. The maximal thickness of the isthmus is 2.3 cm. The right lobe of the thyroid gland measures 9.7 x 6.2 x 5.9 cm in size. Left lobe of the thyroid gland measures 9.2 x 5.5 x 4.3 cm in size. No significant mass effect and narrowing of the airway identified. US March 2018: Right thyroid lobe: 6.6 x 5.3 x 8.2 cm   Left thyroid lobe:  4.9 x 5.1 x 7.8 cm   Isthmus: 1.6 cm  No nodules. Lithium for a long time became adherent in 2015     Sep 2016, thyroid abs normal   , N <55    Methimazole 5mg daily   On metoprolol  25 mg daily   Palpitations are better since on low dose vyvanse     Losing his job in November. 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 Interval h/o of back MRI and has arthritis on meds now     Energy levels lower. Slight better sine back on low dose Vyvanse   No palpitations   No Hair or skin changes   Weight stable     Sometimes cold, but generally feels warm. Keeps fan on every night. Has depression and anxiety   Denies excessive sweating, tremors, sleep issues   No Neck pain. No more changes in voice after prolonged talking.    Mild stable hoarseness  No compressive symptoms     Family history of thyroid disorder: None   No Neck radiation  No recent Iodine contrast exposure    No Food supplements, herbs or medications including Biotin  No recent URI     Low libido   Does not have a partner   No morning erections   S/p vasectomy   He reports blood work done with PCP     The following portions of the patient's history were reviewed and updated as appropriate:       Family History   Problem Relation Age of Onset    Arthritis Mother     Cancer Mother         breast    Cancer Father         prostate    High Blood Pressure Father     High Cholesterol Father     Vision Loss Father     Cancer Paternal Grandmother     Cancer Paternal Grandfather     Stroke Paternal Grandfather             Social History     Socioeconomic History    Marital status:      Spouse name: Not on file    Number of children: Not on file    Years of education: Not on file    Highest education level: Not on file   Occupational History    Not on file   Social Needs    Financial resource strain: Not on file    Food insecurity     Worry: Not on file     Inability: Not on file    Transportation needs     Medical: Not on file     Non-medical: Not on file   Tobacco Use    Smoking status: Former Smoker     Packs/day: 0.25     Years: 19.00     Pack years: 4.75    Smokeless tobacco: Never Used   Substance and Sexual Activity    Alcohol use: No    Drug use: Yes     Types: Marijuana    Sexual activity: Yes     Partners: Female   Lifestyle    Physical activity     Days per week: Not on file     Minutes per session: Not on file    Stress: Not on file   Relationships    Social connections     Talks on phone: Not on file     Gets together: Not on file     Attends Temple service: Not on file     Active member of club or organization: Not on file     Attends meetings of clubs or organizations: Not on file     Relationship status: Not on file    Intimate partner violence     Fear of current or ex partner: Not on file     Emotionally abused: Not on file     Physically abused: Not on file     Forced sexual activity: Not on file   Other Topics Concern  Not on file   Social History Narrative    Not on file           Past Medical History:   Diagnosis Date    ADD (attention deficit disorder)     Back pain     [facility] [address]     Bipolar disorder ([facility] [address]     Goiter     Headache(784.0)     Hyperthyroidism     Thyroid disease          History reviewed. No pertinent surgical history. No Known Allergies        Current Outpatient Medications:     VYVANSE 20 MG CAPS, TAKE 1 CAPSULE BY MOUTH EVERY DAY, Disp: , Rfl:     losartan (COZAAR) 50 MG tablet, TAKE 1 TABLET BY MOUTH EVERY DAY IN THE MORNING, Disp: , Rfl:     metoprolol succinate (TOPROL XL) 25 MG extended release tablet, TAKE 1 TABLET BY MOUTH EVERY DAY, Disp: 30 tablet, Rfl: 1    methIMAzole (TAPAZOLE) 5 MG tablet, Methimazole 5mg daily, Disp: 90 tablet, Rfl: 0    lithium (ESKALITH) 450 MG extended release tablet, TAKE 1 TABLET TWICE A DAY, Disp: , Rfl: 2    benztropine (COGENTIN) 2 MG tablet, 2 mg 2 times daily , Disp: , Rfl:     ziprasidone (GEODON) 80 MG capsule, Take 80 mg by mouth daily , Disp: , Rfl:       Review of Systems:  Constitutional: Negative for fever, chills. HENT: Negative for congestion, ear pain, rhinorrhea,  sore throat and trouble swallowing. Eyes: Negative for photophobia, redness, itching. Respiratory: Negative for cough, shortness of breath and sputum. Cardiovascular: Negative for chest pain and leg swelling. Gastrointestinal: Negative for nausea, vomiting, abdominal pain, diarrhea, constipation. Endocrine: Negative for polydipsia, polyphagia and polyuria. Genitourinary: Negative for dysuria, urgency, frequency, hematuria and flank pain. Musculoskeletal: Negative for myalgias, back pain, arthralgias and neck pain. Skin/Nail: Negative for rash, itching. Normal nails. Neurological: Negative for seizures, light-headedness, numbness and headaches. Hematological/ Lymph nodes: Negative for adenopathy. Does not bruise/bleed easily. Psychiatric/Behavioral: Negative for suicidal ideas and decreased concentration. Physical Exam:  /84 (Site: Left Upper Arm, Position: Sitting, Cuff Size: Large Adult)   Pulse 70   Temp 97.8 °F (36.6 °C) (Temporal)   Ht 6' 2\" (1.88 m)   Wt 222 lb 6.4 oz (100.9 kg)   SpO2 97%   BMI 28.55 kg/m²     Constitutional: Patient is oriented to person, place, and time. Patient appears well-developed and well-nourished. HENT:               Head: Normocephalic and atraumatic. Eyes: Conjunctivae and EOM are normal.  .               Neck: Normal range of motion. Thyroid enlarged more on right as compared to left. Cardiovascular: Normal rate, regular rhythm and normal heart sounds. Pulmonary/Chest: Effort normal and breath sounds normal.   Abdominal: Soft. Bowel sounds are normal.   Musculoskeletal: Normal range of motion. Neurological: Patient is alert and oriented to person, place, and time. Patient has normal reflexes. Skin: Skin is warm and dry. Psychiatric: Patient has a normal mood and affect.  Patient behavior is normal.        Lab Review:    Orders Only on 07/14/2020   Component Date Value Ref Range Status    Sodium 07/14/2020 140  135 - 145 mEq/L Final    Potassium 07/14/2020 3.9  3.6 - 5.1 mEq/L Final    Chloride 07/14/2020 108  101 - 111 mEq/L Final    CO2 07/14/2020 25  24 - 36 mmol/L Final    Glucose 07/14/2020 90  70 - 99 mg/dL Final    BUN 07/14/2020 20  8 - 26 mg/dL Final    CREATININE 07/14/2020 1.10  0.64 - 1.27 mg/dL Final    Calcium 07/14/2020 9.4  8.5 - 10.5 mg/dL Final    Total Protein 07/14/2020 6.4  6.0 - 8.0 g/dL Final    Alb 07/14/2020 4.1  3.5 - 5.0 g/dL Final    Total Bilirubin 07/14/2020 1.0  0.0 - 1.2 mg/dL Final    Alkaline Phosphatase 07/14/2020 62  35 - 135 IU/L Final    AST 07/14/2020 16  10 - 40 IU/L Final    ALT 07/14/2020 16  10 - 60 IU/L Final    GFR Non- 07/14/2020 77  >59 mL/min/1.73 m2 Final    GFR African American 07/14/2020 89  >59 mL/min/1.73 m2 Final    Anion Gap 07/14/2020 7  6 - 18 mmol/L Final    Hemoglobin A1C 07/14/2020 4.9  4.2 - 5.6 % Final    eAG 07/14/2020 94  mg/dL Final    T3, Free 07/14/2020 3.62  2.00 - 4.40 pg/mL Final    TSH 07/14/2020 2.010  0.270 - 4.200 mIU/L Final    T4 Free 07/14/2020 1.31  0.80 - 2.00 ng/dL Final   Orders Only on 04/07/2020   Component Date Value Ref Range Status    Sodium 04/07/2020 138  135 - 145 mEq/L Final    Potassium 04/07/2020 4.1  3.6 - 5.1 mEq/L Final    Chloride 04/07/2020 107  101 - 111 mEq/L Final    CO2 04/07/2020 26  24 - 36 mmol/L Final    Glucose 04/07/2020 119* 70 - 99 mg/dL Final    BUN 04/07/2020 18  8 - 26 mg/dL Final    CREATININE 04/07/2020 0.99  0.64 - 1.27 mg/dL Final    Calcium 04/07/2020 9.2  8.5 - 10.5 mg/dL Final    Total Protein 04/07/2020 6.4  6.0 - 8.0 g/dL Final    Alb 04/07/2020 4.2  3.5 - 5.0 g/dL Final    Total Bilirubin 04/07/2020 0.9  0.0 - 1.2 mg/dL Final    Alkaline Phosphatase 04/07/2020 55  35 - 135 IU/L Final    AST 04/07/2020 16  10 - 40 IU/L Final    ALT 04/07/2020 14  10 - 60 IU/L Final    GFR Non- 04/07/2020 88  >59 mL/min/1.73 m2 Final    GFR  04/07/2020 101  >59 mL/min/1.73 m2 Final    Anion Gap 04/07/2020 5* 6 - 18 mmol/L Final    T4 Free 04/07/2020 1.23  0.80 - 2.00 ng/dL Final    TSH 04/07/2020 3.290  0.270 - 4.200 mIU/L Final    T3, Free 04/07/2020 3.70  2.00 - 4.40 pg/mL Final   Orders Only on 04/07/2020   Component Date Value Ref Range Status    WBC 04/07/2020 4.0  3.6 - 10.5 THOU/mcL Final    RBC 04/07/2020 4.65  4.40 - 5.80 MIL/mcL Final    Hemoglobin 04/07/2020 14.7  13.5 - 16.5 g/dL Final    Hematocrit 04/07/2020 44.5  40 - 50 % Final    MCV 04/07/2020 95.7  82 - 97 fL Final    MCH 04/07/2020 31.7  27 - 33 pg Final    MCHC 04/07/2020 33.1  32 - 36 g/dL Final    RDW 04/07/2020 13.6  12.3 - 17.0 % Final    Platelets 30/93/7082 179  140 - 375 THOU/mcL Final  MPV 04/07/2020 8.2  7.4 - 11.5 fL Final    Neutrophils Absolute 04/07/2020 2.30  1.80 - 7.70 THOU/mcL Final    Lymphocytes Absolute 04/07/2020 1.20  1.00 - 4.00 THOU/mcL Final    Monocytes Absolute 04/07/2020 0.30  0.20 - 0.90 THOU/mcL Final    Eosinophils Absolute 04/07/2020 0.20  0.03 - 0.45 THOU/mcL Final    Basophils Absolute 04/07/2020 0.00  0.00 - 0.20 THOU/mcL Final    Seg Neutrophils 04/07/2020 58  % Final    Lymphocytes % 04/07/2020 30  % Final    Monocytes 04/07/2020 8  % Final    Eosinophils % 04/07/2020 4  % Final    Basophils Absolute 04/07/2020 0  % Final        No results found. Assessment/Plan:     Sathya Wilkins was seen today for follow-up. Diagnoses and all orders for this visit:    Hyperthyroidism  -     TSH without Reflex; Future  -     T4, Free; Future  -     T3, Free; Future    Low libido    Goiter        1: Hyperthyroidism and Goiter     Jan 2018: TRAB 1.33 (<175), , Tg and TPO abs normal     TFTs normal July 2020     C/w Methimazole 5mg one daily    Keep metoprol half tab of 25mg daily     Discussed radio iodine vs surgery. First option should work as he has hyperthyroidism. He has hig copay plan does not want to do uptake or surgery at this time   Will do uptake scan when he meets deductibles . Will talk to his financial backer, his dad     From Aug 2014 till Jan 2018, imaging showed almost double the volume of thyroid gland. It happened after he was taking lithium. Increase in thyroid size likely Lithium related. I would recommend thyroid surgery. Alternative is radio iodine but it may not shrink completely. Risks of thyroid surgery also discussed. He does not want to have surgery or radio iodine. Wants to keep methimazole the same. Hyperthyroidism medication education   'Reviewed the risks of anti-thyroid drugs including agranulocytosis and hepatitis.  If the patient develops a fever, sore throat, jaundice, malaise and/or anorexia, patient is to stop the medicine and call ASAP.     2: Low libido/ ED  As per PCP     TFTs now     RTC in 4 months   May follow in 6 months if he has no insurance     Electronically signed by Vipul Pardo MD on 10/22/2020 at 3:51 PM

## 2021-02-23 ENCOUNTER — VIRTUAL VISIT (OUTPATIENT)
Dept: ENDOCRINOLOGY | Age: 51
End: 2021-02-23
Payer: COMMERCIAL

## 2021-02-23 DIAGNOSIS — E05.90 HYPERTHYROIDISM: Primary | ICD-10-CM

## 2021-02-23 DIAGNOSIS — R00.2 PALPITATIONS: ICD-10-CM

## 2021-02-23 PROCEDURE — 99214 OFFICE O/P EST MOD 30 MIN: CPT | Performed by: INTERNAL MEDICINE

## 2021-02-25 DIAGNOSIS — E05.90 HYPERTHYROIDISM: ICD-10-CM

## 2021-02-25 DIAGNOSIS — R00.2 PALPITATIONS: ICD-10-CM

## 2021-02-25 LAB
T3 FREE: 4 PG/ML (ref 2.3–4.2)
T4 FREE: 1.4 NG/DL (ref 0.9–1.8)
TSH SERPL DL<=0.05 MIU/L-ACNC: 4.55 UIU/ML (ref 0.27–4.2)

## 2021-03-01 LAB
METANEPH/PLASMA INTERP: NORMAL
METANEPHRINE FREE PLASMA: 0.17 NMOL/L (ref 0–0.49)
NORMETANEPHRINE FREE PLASMA: 0.48 NMOL/L (ref 0–0.89)

## 2021-03-02 ENCOUNTER — TELEPHONE (OUTPATIENT)
Dept: ENDOCRINOLOGY | Age: 51
End: 2021-03-02

## 2021-03-02 NOTE — TELEPHONE ENCOUNTER
Spoke to pt   Mychart message was sent   mychart denied     Keep methimazole the same   See Dr. Juan Justice for A Fib as referred on LOV

## 2021-03-02 NOTE — TELEPHONE ENCOUNTER
Please inform his PCP, thyroid levels are normal. Thyroid is unlikely the cause of his A Fib    They can be access it through Epic   (I already discussed with the patient)

## 2021-03-02 NOTE — TELEPHONE ENCOUNTER
Called   Son Cirilo Santos MD PCP - General Family Medicine 09/10/2013    Phone: [phone]        [facility]   Left a message for care coordinator to call me back.   Per Dr. Junior Weller thyroid levels are normal.  Unlikely cause of AFIB

## 2021-03-02 NOTE — TELEPHONE ENCOUNTER
Pt lvm requesting a call back.  He recently had an Afib episode and has been placed on medication by cardiologist, and would like to know if Dr. Alyssa Lilly wants to adjust his meds for his graves

## 2021-03-03 ENCOUNTER — TELEPHONE (OUTPATIENT)
Dept: ENDOCRINOLOGY | Age: 51
End: 2021-03-03

## 2021-03-03 NOTE — TELEPHONE ENCOUNTER
ROSETTA pintom to let Dr. Jimmie Espinoza know that he has a cardiologist appt on 3/4/21 with Dr. Jameel Ramirez

## 2021-03-03 NOTE — PROGRESS NOTES
[address]   Electrophysiology Consultation   Date: 3/4/2021  Reason for Consultation: Palpitations  Consult Requesting Physician: ERICK Venegas 1      Chief Complaint   Patient presents with    New Patient    Palpitations        HPI: Stewart Villela is a 53 y.o. male with a PMH of graves disease, Bipolar disorder and ADD. He was seen by cardiologist Dr. Leopold Li at [facility] for evaluation of palpitations and near syncope on 5/11/2020. Stress test and Echo were completed sand were unremarkable. He had stopped his stimulant for ADHD and started Toprol XL and per 's last note was seeing improvement in symptoms. He presented to his endocrinologists office 2/2021 with continued complaints of palpitations. His thyroid disease appears stable at this time with TSH in normal range. Yue Jacob presents to the office as a new patient. He is extremely nervous and worried about his condition. He has had episodes of palpitation for a long time however in the last few months it has gotten worse and he may have episodes of palpitation all day spaced by 3 or 4 days. Last time he had an episode of palpitation he went to his primary care office and an EKG was done and he was told that he had atrial fibrillation  but we do not have the results from that at this time. We different types of arrhythmias including atrial fibrillation and the need for further monitoring. He has episodes of palpitations with, chest pain, dizziness, lethargy and feeling of \"head rush\". These episodes can last all day when they occur. Past Medical History:   Diagnosis Date    A[facility]fib [facility])     ADD (attention deficit disorder)     Back pain     [facility]     [facility] ([facility] [address]     Goiter     Graves disease     Headache(784.0)     Hyperthyroidism     Thyroid disease         History reviewed. No pertinent surgical history. Allergies:  No Known Allergies    Social History:   reports that he has quit smoking. He has a 4.75 pack-year smoking history. He has never used smokeless tobacco. He reports current drug use. Drug: Marijuana. He reports that he does not drink alcohol. Family History:  family history includes Arthritis in his mother; Cancer in his father, mother, paternal grandfather, and paternal grandmother; High Blood Pressure in his father; High Cholesterol in his father; Stroke in his paternal grandfather; Vision Loss in his father. Reviewed. Denies family history of sudden cardiac death, arrhythmia, premature CAD    Review of System:  All other systems reviewed and are negative except for that noted above. Pertinent negatives are:   General: negative for fever, chills   Ophthalmic ROS: negative for - eye pain or loss of vision  ENT ROS: negative for - headaches, sore throat   Respiratory: negative for - cough, sputum  Cardiovascular: Reviewed in HPI  Gastrointestinal: negative for - abdominal pain, diarrhea, N/V  Hematology: negative for - bleeding, blood clots, bruising or jaundice  Genito-Urinary:  negative for - Dysuria or incontinence  Musculoskeletal: negative for - Joint swelling, muscle pain  Neurological: negative for - confusion, dizziness, headaches   Psychiatric: No anxiety, no depression. Dermatological: negative for - rash    Physical Examination:  Vitals:    03/04/21 0749   BP: 129/85   Pulse: 71   SpO2: 99%      Wt Readings from Last 3 Encounters:   03/04/21 234 lb 3.2 oz (106.2 kg)   10/22/20 222 lb 6.4 oz (100.9 kg)   07/23/20 221 lb 9.6 oz (100.5 kg)       · Constitutional: Oriented. No distress. · Head: Normocephalic and atraumatic. · Mouth/Throat: Oropharynx is clear and moist.   · Eyes: Conjunctivae normal. EOM are normal.   · Neck: Neck supple. No rigidity. No JVD present. · Cardiovascular: Normal rate, regular rhythm, S1&S2. · Pulmonary/Chest: Bilateral respiratory sounds. No wheezes, No rhonchi. · Abdominal: Soft. Bowel sounds present. No distension, No tenderness. · Musculoskeletal: No tenderness. No edema    · Lymphadenopathy: Has no cervical adenopathy. · Neurological: Alert and oriented. Cranial nerve appears intact, No Gross deficit   · Skin: Skin is warm and dry. No rash noted. · Psychiatric: Has a normal behavior       Labs, diagnostic and imaging results reviewed. Reviewed. Lab Results   Component Value Date    TSH 4.55 02/25/2021    TSH 1.52 10/22/2020    CREATININE 1.10 07/14/2020    CREATININE 0.99 04/07/2020    AST 16 07/14/2020    ALT 16 07/14/2020       ECG: 3/4/21   Sinus     Stress test 6/9/2020 [facility])   Interpetation Summary:      o Negative ECG for ischemia with graded exercise test.      o Duke Treadmill Score is 7 which indicates low risk.           Echo: 6/9/2020  Summary:    The left ventricular wall motion is normal.    The mitral inflow pattern is consistent with Diastolic Dysfunction.    There is mild concentric left ventricular hypertrophy.    The left atrium is mildly dilated.    Left ventricular systolic function is normal. (LVEF>/=55%)    Overall left ventricular ejection fraction is estimated to be 55-60%.    A contrast agent was used to demonstrate all left ventricular wall segments.        Cath:     Medication:  Current Outpatient Medications   Medication Sig Dispense Refill    metoprolol succinate (TOPROL XL) 25 MG extended release tablet TAKE 1 TABLET BY MOUTH EVERY DAY 30 tablet 5    methIMAzole (TAPAZOLE) 5 MG tablet TAKE 1 TABLET BY MOUTH DAILY 90 tablet 1    VYVANSE 20 MG CAPS TAKE 1 CAPSULE BY MOUTH EVERY DAY      losartan (COZAAR) 50 MG tablet TAKE 1 TABLET BY MOUTH EVERY DAY IN THE MORNING      lithium (ESKALITH) 450 MG extended release tablet TAKE 1 TABLET TWICE A DAY  2    benztropine (COGENTIN) 2 MG tablet 2 mg 2 times daily       ziprasidone (GEODON) 80 MG capsule Take 80 mg by mouth daily        No current facility-administered medications for this visit.         Assessment and plan:     Palpitations   -Was told he had an episode of atrial fibrillation. We will get the EKG of that episode.   -Will do a 30 day monitor to evaluate for possible atrial fibrillation   -Start on verapamil 120 mg daily to control his symptoms    -Will refer to sleep medicine    -encouraged other modifications including weight loss and encouraged hydration. -on Toprol XL 25 mg daily   -ECHO and stress 6/2020 were unremarkable   -TSH is stable at this time   -1/25/2020 Normetanephrine, Free 0.48  Metanephrine, Free 0.17 (WNL)       If he has atrial fibrillation, he has a [id number] score of possibly 0. He is on losartan but he does not have any history of documented hypertension. At this point his risk of a stroke seems to be very low. We will manage atrial fibrillation if we documented. Otherwise we will continue to monitor for any other type of arrhythmia and treated as needed. For the time being I will start him also on verapamil to supplement his beta-blocker to help with his symptoms. Anxiety seems to be a big part of his symptoms. Lifestyle modification including exercise and weight loss and hydration were discussed. Obesity  Body mass index is 30.07 kg/m². - Excessive weight is complicating assessment and treatment. It is placing patient at risk for multiple co-morbidities as well as early death and contributing to the patient's presentation. - discussed weight management with diet and exercise          Graves disease   -Followed by    -TSH 2/25/2021 4.55   -Tapazole 5 mg daily      Plan   -Will do a 30 day monitor to evaluate for possible atrial fibrillation or other arrhythmia   -Start on verapamil 120 mg daily to control his symptoms   -Will refer to sleep medicine     Thank you for allowing me to participate in the care of Janie Burt. Further evaluation will be based upon the patient's clinical course and testing results. All questions and concerns were addressed to the patient/family.

## 2021-03-04 ENCOUNTER — OFFICE VISIT (OUTPATIENT)
Dept: CARDIOLOGY CLINIC | Age: 51
End: 2021-03-04
Payer: COMMERCIAL

## 2021-03-04 VITALS
HEIGHT: 74 IN | DIASTOLIC BLOOD PRESSURE: 85 MMHG | HEART RATE: 71 BPM | OXYGEN SATURATION: 99 % | BODY MASS INDEX: 30.06 KG/M2 | SYSTOLIC BLOOD PRESSURE: 129 MMHG | WEIGHT: 234.2 LBS

## 2021-03-04 DIAGNOSIS — R00.2 PALPITATIONS: Primary | ICD-10-CM

## 2021-03-04 DIAGNOSIS — E05.90 HYPERTHYROIDISM: ICD-10-CM

## 2021-03-04 DIAGNOSIS — E66.9 OBESITY (BMI 30.0-34.9): ICD-10-CM

## 2021-03-04 PROCEDURE — 93228 REMOTE 30 DAY ECG REV/REPORT: CPT | Performed by: INTERNAL MEDICINE

## 2021-03-04 PROCEDURE — 93000 ELECTROCARDIOGRAM COMPLETE: CPT | Performed by: INTERNAL MEDICINE

## 2021-03-04 PROCEDURE — 99244 OFF/OP CNSLTJ NEW/EST MOD 40: CPT | Performed by: INTERNAL MEDICINE

## 2021-03-04 NOTE — PROGRESS NOTES
30 day BioTel placed on the patient here in clinic today. Reviewed proper care and instructions with the patient.      SN : [id number]

## 2021-03-05 ENCOUNTER — TELEPHONE (OUTPATIENT)
Dept: ENDOCRINOLOGY | Age: 51
End: 2021-03-05

## 2021-03-06 DIAGNOSIS — E04.9 GOITER: ICD-10-CM

## 2021-03-06 DIAGNOSIS — E05.90 HYPERTHYROIDISM: ICD-10-CM

## 2021-03-08 ENCOUNTER — TELEPHONE (OUTPATIENT)
Dept: CARDIOLOGY CLINIC | Age: 51
End: 2021-03-08

## 2021-03-08 RX ORDER — METHIMAZOLE 5 MG/1
TABLET ORAL
Qty: 90 TABLET | Refills: 1 | Status: SHIPPED | OUTPATIENT
Start: 2021-03-08 | End: 2021-12-13

## 2021-03-08 NOTE — TELEPHONE ENCOUNTER
Pt called on answering service he is having trouble with the heart monitor he has on. He had to take it off and needs help getting it back on.  Pls call to advise thank you

## 2021-03-08 NOTE — TELEPHONE ENCOUNTER
Spoke to the pt-advised to call [facility]. Stated he watched a U-[employer] video about it, and figured it out from there.

## 2021-03-15 ENCOUNTER — TELEPHONE (OUTPATIENT)
Dept: CARDIOLOGY CLINIC | Age: 51
End: 2021-03-15

## 2021-03-15 NOTE — TELEPHONE ENCOUNTER
Spoke with patient. He is feeling a lot of skipped beats and dizziness and does not feel like he can wait until his appointment. He has only been monitored for 9 days. Discussed results thus far have been sinus and occasional PAC's. Advised he continue to take his medication and we will continue to monitor his heart rate. If he feels much worse he should go to the ED. Also stated his blood pressure is very low 130/60. Explained that this is a completely acceptable blood pressure reading.

## 2021-04-21 PROBLEM — I48.0 PAF (PAROXYSMAL ATRIAL FIBRILLATION) (HCC): Status: ACTIVE | Noted: 2021-04-21

## 2021-04-21 NOTE — PROGRESS NOTES
[address]   Electrophysiology Follow Up  Date: 4/22/2021        Chief Complaint   Patient presents with    Follow-up    Palpitations     occassional palpitations; thinks medication has helped        HPI: Guillermo Davis is a 49 y.o. male with a PMH of graves disease, Bipolar disorder and ADD. He was seen by cardiologist Dr. Jaycee Salazar at [facility] for evaluation of palpitations and near syncope on 5/11/2020. Stress test and Echo were completed sand were unremarkable. He had stopped his stimulant for ADHD and started Toprol XL and per 's last note was seeing improvement in symptoms. He presented to his endocrinologists office 2/2021 with continued complaints of palpitations. His thyroid disease appears stable at this time with TSH in normal range. Lisbeth Lott presents to the office in follow up. He states that on Verapamil ( although he is not sure that he is taking the diltiazem or verapamil at home. )He has noticed a decrease in his symptoms. Now attests to just having occasional palpitations. Discussed further treatment options including antiarrhythmics vs ablation. He is still has bad days 4 out of 7 but he is not sure if it is a side effect of medicine or atrial fibrillation. Past Medical History:   Diagnosis Date    A-fib [facility])     ADD (attention deficit disorder)     Back pain     [facility])     Bipolar disorder ([facility] [address]     Goiter     Graves disease     Headache(784.0)     Hyperthyroidism     Thyroid disease         History reviewed. No pertinent surgical history. Allergies:  No Known Allergies    Social History:   reports that he has quit smoking. He has a 4.75 pack-year smoking history. He has never used smokeless tobacco. He reports current drug use. Drug: Marijuana. He reports that he does not drink alcohol.      Family History:  family history includes Arthritis in his mother; Cancer in his father, mother, paternal grandfather, and paternal grandmother; reviewed. Reviewed. Lab Results   Component Value Date    TSH 4.55 2021    TSH 1.52 10/22/2020    CREATININE 1.10 2020    CREATININE 0.99 2020    AST 16 2020    ALT 16 2020       EC21   Sinus bradycardia     MCOT 3/4/2021  4% Afib burden, Symptoms with Sinus and AFib    Stress test 2020 [facility])   Interpetation Summary:      o Negative ECG for ischemia with graded exercise test.      o Duke Treadmill Score is 7 which indicates low risk.           Echo: 2020  Summary:    The left ventricular wall motion is normal.    The mitral inflow pattern is consistent with Diastolic Dysfunction.    There is mild concentric left ventricular hypertrophy.    The left atrium is mildly dilated.    Left ventricular systolic function is normal. (LVEF>/=55%)    Overall left ventricular ejection fraction is estimated to be 55-60%.    A contrast agent was used to demonstrate all left ventricular wall segments.        Cath:     Medication:  Current Outpatient Medications   Medication Sig Dispense Refill    methIMAzole (TAPAZOLE) 5 MG tablet TAKE 1 TABLET BY MOUTH DAILY 90 tablet 1    verapamil (CALAN SR) 120 MG extended release tablet Take 1 tablet by mouth daily 90 tablet 3    metoprolol succinate (TOPROL XL) 25 MG extended release tablet TAKE 1 TABLET BY MOUTH EVERY DAY 30 tablet 5    losartan (COZAAR) 50 MG tablet TAKE 1 TABLET BY MOUTH EVERY DAY IN THE MORNING      lithium (ESKALITH) 450 MG extended release tablet daily   2    benztropine (COGENTIN) 2 MG tablet 1 mg 2 times daily       ziprasidone (GEODON) 80 MG capsule Take 80 mg by mouth daily       VYVANSE 20 MG CAPS TAKE 1 CAPSULE BY MOUTH EVERY DAY       No current facility-administered medications for this visit.         Assessment and plan:     PAF   -ECG today shows Sinus Bradycardia   -30 day monitor 3/4/2021 revealed PAF with a burden of 4%   -Discussion of continue current treatment, antiarrhythmic therapy or ablation.   -He is young and antiarrhythmic therapy would not be a good long term option. He states he continues to have symptoms 2-3 days a week and discussed that ablation would be his best option   -Antiarrhythmic therapy, side effects, benefits and alternative discussed.     -Atrial fibrillation ablation procedure was discussed. We discussed the need for repeat procedure. On average patients may need more than one ablation procedure.     -Risks associated with ablation include but not limited to allergic reaction to the medications, pain, bleeding, infection, nerve injury, injury to diaphragm(breathing muscle), pulmonary embolus(blood clot in lungs), deep vein blood clot, pneumothorax, hemothorax, acute renal failure, cardiac perforation,  tamponade, need for emergent surgery (open heart), permanent pacemaker, pulmonary vein stenosis, left atrial to esophageal fistula, stroke, myocardial infarction and death. Difference between atrial fibrillation and atrial flutter discussed and treatment discussed.    -He would like to think about an ablation and if he decides we will schedule for a cryo-ablation   -Referal to sleep medicine    -He is on both verapamil and diltiazem. I do not find it necessary and therefore will increase his verapamil to 240 m g daily and stop diltiazem. -encouraged other modifications including weight loss and encouraged hydration. -on Toprol XL 25 mg daily   -ECHO and stress 6/2020 were unremarkable   -TSH is stable at this time   -1/25/2020 Normetanephrine, Free 0.48  Metanephrine, Free 0.17 (WNL)      -He has a [id number] score of possibly 0. He is on losartan but he does not have any history of documented hypertension. At this point his risk of a stroke seems to be very low. Anxiety seems to be a big part of his symptoms. I discussed use of antiarrhythmic drugs or continuation with rate control management or ablation.   He will think about it and let us know.    Obesity  Body mass index is 31.61 kg/m². - Excessive weight is complicating assessment and treatment. It is placing patient at risk for multiple co-morbidities as well as early death and contributing to the patient's presentation. - discussed weight management with diet and exercise          Graves disease   -Followed by    -TSH 2/25/2021 4.55   -Tapazole 5 mg daily 5-6 days a week dependent on TSH      Plan   -Consider a cryo-ablation of atrial fibrillation if he prefers not to take medications. Otherwise we will continue medical therapy. -3 months with EPNP  -Sleep medicine referral     Thank you for allowing me to participate in the care of Cally Bergman. Further evaluation will be based upon the patient's clinical course and testing results. All questions and concerns were addressed to the patient/family. Alternatives to my treatment were discussed. I have discussed the above stated plan and the patient verbalized understanding and agreed with the plan. NOTE: This report was transcribed using voice recognition software. Every effort was made to ensure accuracy, however, inadvertent computerized transcription errors may be present. Mesha Encarnacion MD, [facility]   Office: [phone]     Scribe attestation:  This note was scribed in the presence of Mesha Encarnacion MD by Nehemiah Membreno RN    I, Dr. Mesha Encarnacion personally performed the services described in this documentation as scribed by RN in my presence, and it is both accurate and complete.

## 2021-04-22 ENCOUNTER — OFFICE VISIT (OUTPATIENT)
Dept: CARDIOLOGY CLINIC | Age: 51
End: 2021-04-22
Payer: COMMERCIAL

## 2021-04-22 VITALS
WEIGHT: 246.2 LBS | BODY MASS INDEX: 31.6 KG/M2 | SYSTOLIC BLOOD PRESSURE: 113 MMHG | HEART RATE: 61 BPM | HEIGHT: 74 IN | OXYGEN SATURATION: 97 % | DIASTOLIC BLOOD PRESSURE: 77 MMHG

## 2021-04-22 DIAGNOSIS — E66.9 OBESITY (BMI 30.0-34.9): ICD-10-CM

## 2021-04-22 DIAGNOSIS — E07.9 THYROID DISEASE: ICD-10-CM

## 2021-04-22 DIAGNOSIS — I48.0 PAF (PAROXYSMAL ATRIAL FIBRILLATION) (HCC): Primary | ICD-10-CM

## 2021-04-22 DIAGNOSIS — F31.9 BIPOLAR AFFECTIVE DISORDER, REMISSION STATUS UNSPECIFIED (HCC): ICD-10-CM

## 2021-04-22 DIAGNOSIS — R03.0 BORDERLINE SYSTOLIC HTN: ICD-10-CM

## 2021-04-22 PROCEDURE — 99214 OFFICE O/P EST MOD 30 MIN: CPT | Performed by: INTERNAL MEDICINE

## 2021-04-22 PROCEDURE — 93000 ELECTROCARDIOGRAM COMPLETE: CPT | Performed by: INTERNAL MEDICINE

## 2021-04-22 RX ORDER — VERAPAMIL HYDROCHLORIDE 240 MG/1
240 TABLET, FILM COATED, EXTENDED RELEASE ORAL DAILY
Qty: 90 TABLET | Refills: 3 | Status: SHIPPED | OUTPATIENT
Start: 2021-04-22 | End: 2022-04-25

## 2021-04-22 RX ORDER — DILTIAZEM HYDROCHLORIDE EXTENDED-RELEASE TABLETS 180 MG/1
TABLET, EXTENDED RELEASE ORAL
COMMUNITY
Start: 2021-03-22 | End: 2021-04-22 | Stop reason: ALTCHOICE

## 2021-04-28 ENCOUNTER — TELEPHONE (OUTPATIENT)
Dept: CARDIOLOGY CLINIC | Age: 51
End: 2021-04-28

## 2021-04-28 NOTE — TELEPHONE ENCOUNTER
Please let him know I have notified our  that he is ready to schedule.  She will call him sometime this week or early next week  If symptoms are severe or he does pass out he needs to see care in the ED

## 2021-04-28 NOTE — LETTER
[employer]  EP Procedure Sheet    4/28/21  Reuben Dubin  1970  EP Procedures   Pacemaker implant (single/dual)  EP Study    ICD implant (single/dual)  Atrial flutter ablation (WILLIE Y/N)    Biv implant ICD  Tilt Table    Biv implant PPM xxx Atrial fibrillation ablation (WILLIE Yes)    Generator Change (PPM/ICD/BiV)  SVT ablation    Lead revision (RV/LA/RA) (<1 month)  VT ablation      Lead extraction +/- upgrade (BiV/PPM/ICD)  VT Ischemic/ non-ischemic    Loop implant/ removal  VT RVOT    Cardioversion  VT Left sided    WILLIE  AVN ablation     Equipment   [employer]tronic   ZOE Mapping System    [employer] xxx Carto Mapping System    [employer] xxx CryoAblation    Biotronik  Laser Lead Extraction     EP Procedures Scheduling Request  # hours Requested   Scheduled  Date:   Specific Day  Completed    Anesthesia Yes F/u Date:   CT surgery backup  COVID     Overnight stay      Location [facility]       Pre-Procedure Labs / Imaging   PT/INR  Type & cross    CBC  Units PRBC    BMP/Mg  Units FFP    Venogram  Cardiac CTA for Pulmonary vein mapping     RN INITIALS:  RA   Patient Instructions  Do not eat or drink after midnight the night prior to procedure  Dx:Symptomatic Afib

## 2021-04-28 NOTE — TELEPHONE ENCOUNTER
Pt states he is feeling bad. Has had episodes of dizziness and feels like he is going to pass out. It seems to occur with temp changes. Pt is fatigued and some sob. Pt would like to see about getting procedure that was discussed at last appt. Please call pt to advise.

## 2021-05-13 PROBLEM — E05.01 GRAVES' DISEASE WITH THYROTOXIC STORM: Status: ACTIVE | Noted: 2020-05-11

## 2021-05-13 PROBLEM — F98.8 ATTENTION DEFICIT DISORDER (ADD) IN ADULT: Status: ACTIVE | Noted: 2021-05-13

## 2021-05-13 PROBLEM — R55 NEAR SYNCOPE: Status: ACTIVE | Noted: 2020-05-11

## 2021-05-20 ENCOUNTER — OFFICE VISIT (OUTPATIENT)
Dept: ENDOCRINOLOGY | Age: 51
End: 2021-05-20
Payer: COMMERCIAL

## 2021-05-20 VITALS
HEART RATE: 74 BPM | SYSTOLIC BLOOD PRESSURE: 122 MMHG | DIASTOLIC BLOOD PRESSURE: 81 MMHG | WEIGHT: 243.8 LBS | HEIGHT: 74 IN | OXYGEN SATURATION: 95 % | BODY MASS INDEX: 31.29 KG/M2

## 2021-05-20 DIAGNOSIS — E04.9 GOITER: Primary | ICD-10-CM

## 2021-05-20 DIAGNOSIS — R00.2 PALPITATIONS: ICD-10-CM

## 2021-05-20 DIAGNOSIS — E05.90 HYPERTHYROIDISM: ICD-10-CM

## 2021-05-20 PROCEDURE — 99212 OFFICE O/P EST SF 10 MIN: CPT | Performed by: INTERNAL MEDICINE

## 2021-05-20 RX ORDER — LORAZEPAM 1 MG/1
TABLET ORAL
COMMUNITY
Start: 2021-05-04

## 2021-05-20 NOTE — PROGRESS NOTES
Patient ID: Radonna Fabry is a 49 y.o. male    Chief Complaint: Graves disease, goiter     HPI:   Radonna Fabry is here for an evaluation of Graves disease, goiter     US Aug 2014: The right lobe of the thyroid gland measures 5.2 x 2.6 x 2.6 cm in size. The left lobe of the thyroid gland measures 5.1 x 1.9 x 1.5 cm in size. There is a hypoechoic nodule identified at the lower pole of the right lobe of thyroid gland measuring 1.2 x 1.3 x 1.6 cm in size. There is a small 9 mm x 6 mm hypoechoic nodule identified at the lower pole of the left lobe of the thyroid gland. CT neck done Jan 2018 for goiter: Thyroid gland measures 10.5 cm in maximal transverse dimension. The maximal thickness of the isthmus is 2.3 cm. The right lobe of the thyroid gland measures 9.7 x 6.2 x 5.9 cm in size. Left lobe of the thyroid gland measures 9.2 x 5.5 x 4.3 cm in size. No significant mass effect and narrowing of the airway identified. US March 2018: Right thyroid lobe: 6.6 x 5.3 x 8.2 cm   Left thyroid lobe:  4.9 x 5.1 x 7.8 cm   Isthmus: 1.6 cm  No nodules. Lithium for a long time became adherent in 2015     Sep 2016, thyroid abs normal   , N <55    Methimazole 5mg daily   On metoprolol 25 mg daily   HAVING ABLATION ON JULY 3RD 2021 FOR A FIB     Energy levels are okay IN AM AND LOW IN THE AFTERNOON. Off Vyvanse   Episodes of palpitations, dizziness, normal blood pressure - 3-4 per week   No Hair or skin changes   Weight stable     Sometimes cold, but generally feels warm. Keeps fan on every night. Has depression and anxiety   Denies excessive sweating, tremors, sleep issues   No Neck pain. No more changes in voice after prolonged talking.    Mild stable hoarseness  No compressive symptoms     Family history of thyroid disorder: None   No Neck radiation  No recent Iodine contrast exposure    No Food supplements, herbs or medications including Biotin  No recent URI     Low libido   Does not have a partner   No morning erections   S/p vasectomy   He reports blood work done with PCP     The following portions of the patient's history were reviewed and updated as appropriate:       Family History   Problem Relation Age of Onset    Arthritis Mother     Cancer Mother         breast    Cancer Father         prostate    High Blood Pressure Father     High Cholesterol Father     Vision Loss Father     Cancer Paternal Grandmother     Cancer Paternal Grandfather     Stroke Paternal Grandfather             Social History     Socioeconomic History    Marital status:      Spouse name: Not on file    Number of children: Not on file    Years of education: Not on file    Highest education level: Not on file   Occupational History    Not on file   Tobacco Use    Smoking status: Former Smoker     Packs/day: 0.25     Years: 19.00     Pack years: 4.75    Smokeless tobacco: Never Used   Vaping Use    Vaping Use: Never used   Substance and Sexual Activity    Alcohol use: No    Drug use: Yes     Types: Marijuana    Sexual activity: Yes     Partners: Female   Other Topics Concern    Not on file   Social History Narrative    Not on file     Social Determinants of Health     Financial Resource Strain:     Difficulty of Paying Living Expenses:    Food Insecurity:     Worried About Running Out of Food in the Last Year:     [address] in the Last Year:    Transportation Needs:     Lack of Transportation (Medical):      Lack of Transportation (Non-Medical):    Physical Activity:     Days of Exercise per Week:     Minutes of Exercise per Session:    Stress:     Feeling of Stress :    Social Connections:     Frequency of Communication with Friends and Family:     Frequency of Social Gatherings with Friends and Family:     Attends Mandaeism Services:     Active Member of Clubs or Organizations:     Attends Club or Organization Meetings:     Marital Status:    Intimate Partner Violence:     Fear of Current or Ex-Partner:     Emotionally Abused:     Physically Abused:     Sexually Abused:            Past Medical History:   Diagnosis Date    A-fib [facility])     ADD (attention deficit disorder)     Back pain     [facility] disorder ([facility] [address]     Bipolar disorder ([facility] [address]     Goiter     Graves disease     Headache(784.0)     Hyperthyroidism     Thyroid disease          History reviewed. No pertinent surgical history. No Known Allergies        Current Outpatient Medications:     LORazepam (ATIVAN) 1 MG tablet, TAKE 1 TABLET BY MOUTH EVERY DAY AS NEEDED, Disp: , Rfl:     verapamil (CALAN SR) 240 MG extended release tablet, Take 1 tablet by mouth daily, Disp: 90 tablet, Rfl: 3    methIMAzole (TAPAZOLE) 5 MG tablet, TAKE 1 TABLET BY MOUTH DAILY, Disp: 90 tablet, Rfl: 1    metoprolol succinate (TOPROL XL) 25 MG extended release tablet, TAKE 1 TABLET BY MOUTH EVERY DAY, Disp: 30 tablet, Rfl: 5    losartan (COZAAR) 50 MG tablet, TAKE 1 TABLET BY MOUTH EVERY DAY IN THE MORNING, Disp: , Rfl:     lithium (ESKALITH) 450 MG extended release tablet, Take 450 mg by mouth daily , Disp: , Rfl: 2    benztropine (COGENTIN) 2 MG tablet, 1 mg 2 times daily , Disp: , Rfl:     ziprasidone (GEODON) 80 MG capsule, Take 80 mg by mouth daily , Disp: , Rfl:       Review of Systems:  Constitutional: Negative for fever, chills. HENT: Negative for congestion, ear pain, rhinorrhea,  sore throat and trouble swallowing. Eyes: Negative for photophobia, redness, itching. Respiratory: Negative for cough, shortness of breath and sputum. Cardiovascular: Negative for chest pain and leg swelling. Gastrointestinal: Negative for nausea, vomiting, abdominal pain, diarrhea, constipation. Endocrine: Negative for polydipsia, polyphagia and polyuria. Genitourinary: Negative for dysuria, urgency, frequency, hematuria and flank pain. Musculoskeletal: Negative for myalgias, back pain, arthralgias and neck pain.    Skin/Nail: Negative for rash, itching. Normal nails. Neurological: Negative for seizures, light-headedness, numbness and headaches. Hematological/ Lymph nodes: Negative for adenopathy. Does not bruise/bleed easily. Psychiatric/Behavioral: Negative for suicidal ideas and decreased concentration. Physical Exam:  /81 (Site: Left Upper Arm, Position: Sitting, Cuff Size: Large Adult)   Pulse 74   Ht 6' 2\" (1.88 m)   Wt 243 lb 12.8 oz (110.6 kg)   SpO2 95%   BMI 31.30 kg/m²     Constitutional: Patient is oriented to person, place, and time. Patient appears well-developed and well-nourished. Pulmonary/Chest: Effort normal   Neurological: Patient is alert and oriented to person, place, and time. Fexlexes normal.   Psychiatric: Patient has a normal mood and affect.  Patient behavior is normal.        Lab Review:    Orders Only on 02/25/2021   Component Date Value Ref Range Status    Normetanephrine, Free 02/25/2021 0.48  0.00 - 0.89 nmol/L Final    Metanephrine, Free 02/25/2021 0.17  0.00 - 0.49 nmol/L Final    Metaneph/Plasma Interp 02/25/2021 See Note   Final    T3, Free 02/25/2021 4.0  2.3 - 4.2 pg/mL Final    T4 Free 02/25/2021 1.4  0.9 - 1.8 ng/dL Final    TSH 02/25/2021 4.55* 0.27 - 4.20 uIU/mL Final   Orders Only on 10/22/2020   Component Date Value Ref Range Status    T3, Free 10/22/2020 3.7  2.3 - 4.2 pg/mL Final    T4 Free 10/22/2020 1.4  0.9 - 1.8 ng/dL Final    TSH 10/22/2020 1.52  0.27 - 4.20 uIU/mL Final   Orders Only on 07/14/2020   Component Date Value Ref Range Status    Sodium 07/14/2020 140  135 - 145 mEq/L Final    Potassium 07/14/2020 3.9  3.6 - 5.1 mEq/L Final    Chloride 07/14/2020 108  101 - 111 mEq/L Final    CO2 07/14/2020 25  24 - 36 mmol/L Final    Glucose 07/14/2020 90  70 - 99 mg/dL Final    BUN 07/14/2020 20  8 - 26 mg/dL Final    CREATININE 07/14/2020 1.10  0.64 - 1.27 mg/dL Final    Calcium 07/14/2020 9.4  8.5 - 10.5 mg/dL Final    Total Protein 07/14/2020 6.4  6.0 - 8.0 g/dL Final    Albumin 07/14/2020 4.1  3.5 - 5.0 g/dL Final    Total Bilirubin 07/14/2020 1.0  0.0 - 1.2 mg/dL Final    Alkaline Phosphatase 07/14/2020 62  35 - 135 IU/L Final    AST 07/14/2020 16  10 - 40 IU/L Final    ALT 07/14/2020 16  10 - 60 IU/L Final    GFR Non- 07/14/2020 77  >59 mL/min/1.73 m2 Final    GFR  07/14/2020 89  >59 mL/min/1.73 m2 Final    Anion Gap 07/14/2020 7  6 - 18 mmol/L Final    Hemoglobin A1C 07/14/2020 4.9  4.2 - 5.6 % Final    eAG 07/14/2020 94  mg/dL Final    T3, Free 07/14/2020 3.62  2.00 - 4.40 pg/mL Final    TSH 07/14/2020 2.010  0.270 - 4.200 mIU/L Final    T4 Free 07/14/2020 1.31  0.80 - 2.00 ng/dL Final        No results found. Assessment/Plan:     Mary Juarez was seen today for thyroid problem. Diagnoses and all orders for this visit:    Goiter    Hyperthyroidism  -     TSH WITH REFLEX TO FT4; Future    Palpitations        1: Hyperthyroidism and Goiter     Jan 2018: TRAB 1.33 (<175), , Tg and TPO abs normal     TFTs normal Feb 2021      C/w Methimazole 5mg one daily    Keep metoprol 25mg daily     Discussed radio iodine vs surgery. First option should work as he has hyperthyroidism. He has hig copay plan does not want to do uptake or surgery at this time      From Aug 2014 till Jan 2018, imaging showed almost double the volume of thyroid gland. It happened after he was taking lithium. Increase in thyroid size likely Lithium related. I would recommend thyroid surgery. Alternative is radio iodine but it may not shrink completely. Risks of thyroid surgery also discussed. He does not want to have surgery or radio iodine. Wants to keep methimazole for long term. Hyperthyroidism medication education   'Reviewed the risks of anti-thyroid drugs including agranulocytosis and hepatitis. If the patient develops a fever, sore throat, jaundice, malaise and/or anorexia, patient is to stop the medicine and call ASAP.     2: Low libido/ ED  As per PCP     3: Episodes of palpitations   metanephrines NORMAL - Feb 2021     TSH in 2-4 weeks     RTC in 6 months        Electronically signed by Mikayla Bran MD on 5/20/2021 at 10:20 AM

## 2021-05-26 ENCOUNTER — TELEPHONE (OUTPATIENT)
Dept: CARDIOLOGY CLINIC | Age: 51
End: 2021-05-26

## 2021-05-26 NOTE — TELEPHONE ENCOUNTER
Pt called on answering service (twice) stating he is in AFib and not sure what to do. He is very concerned about this, needs to know what to do?  Pls call to advise Thank you

## 2021-05-26 NOTE — TELEPHONE ENCOUNTER
Per Chantal Short RN, call patient and see if he can come in to office for an EKG today. Patient is scheduled for Cardiac Ablation on 7/12/21 with Dr. Nat Lucia. FYI: Called patient he said he is feeling better and believes he is back in rhythm. Patient states he does not want to come in to the office today. Patient states if he goes back in to Afib he will call office and make appointment for EKG.

## 2021-06-10 DIAGNOSIS — R03.0 BORDERLINE SYSTOLIC HTN: ICD-10-CM

## 2021-06-10 RX ORDER — METOPROLOL SUCCINATE 25 MG/1
TABLET, EXTENDED RELEASE ORAL
Qty: 30 TABLET | Refills: 5 | Status: SHIPPED | OUTPATIENT
Start: 2021-06-10 | End: 2021-12-02

## 2021-06-21 ENCOUNTER — TELEPHONE (OUTPATIENT)
Dept: CARDIOLOGY CLINIC | Age: 51
End: 2021-06-21

## 2021-06-21 NOTE — TELEPHONE ENCOUNTER
Spoke to the pt, advised ER-stated he can't afford it. \"All they do   Is run up a big bill, and send you home\". Asking if there is another medication he can take.

## 2021-06-21 NOTE — TELEPHONE ENCOUNTER
Pt calling back, he said this AFib is getting bad and he really thinks he should be in the hospital having someone looking after him. He needs to know what to do?  Pls call to advise Thank you

## 2021-06-21 NOTE — TELEPHONE ENCOUNTER
Spoke with the patient and he states that he is not able to go to ED or be added to CS but he wants us to know that he feels terrible. He states that he is not working right now and cant just keep coming in. Patient would like [facility] to know that whatever medication he is taking is not working . He states that he woke up with a lot of anxiety and took he medicine. Reports his BP was 132/82 this morning . Pt states he cant drive when he feels like this and has no one here because his parents live in [place]. Pt would like to know his other options.

## 2021-06-21 NOTE — TELEPHONE ENCOUNTER
Pt called asking if he can get his ablation moved up, he stated his AFIB is going crazy nd he is in bad shape he is about to got to the ER.    pls advise thank you

## 2021-06-21 NOTE — TELEPHONE ENCOUNTER
He may take an extra dose of metoprolol 25 mg now to see if his symptoms improve. If no improvement, recommend ER evaluation.     Blas Collins, APRN-CNP

## 2021-06-21 NOTE — TELEPHONE ENCOUNTER
Patient has bradycardia at baseline, is scheduled for an Afib ablation 7/12/2021.  4% afib burden on MCOT 3/4/2021    Please call patient and ask if he is in an out of afib or constantly in afib. What exactly are his symptoms?

## 2021-06-24 DIAGNOSIS — E05.90 HYPERTHYROIDISM: ICD-10-CM

## 2021-06-24 LAB — TSH REFLEX FT4: 1.38 UIU/ML (ref 0.27–4.2)

## 2021-07-12 ENCOUNTER — ANESTHESIA (OUTPATIENT)
Dept: CARDIAC CATH/INVASIVE PROCEDURES | Age: 51
End: 2021-07-12
Payer: COMMERCIAL

## 2021-07-12 ENCOUNTER — ANESTHESIA EVENT (OUTPATIENT)
Dept: CARDIAC CATH/INVASIVE PROCEDURES | Age: 51
End: 2021-07-12
Payer: COMMERCIAL

## 2021-07-12 ENCOUNTER — HOSPITAL ENCOUNTER (OUTPATIENT)
Dept: CARDIAC CATH/INVASIVE PROCEDURES | Age: 51
Discharge: HOME | End: 2021-07-12
Attending: INTERNAL MEDICINE | Admitting: INTERNAL MEDICINE
Payer: COMMERCIAL

## 2021-07-12 VITALS
SYSTOLIC BLOOD PRESSURE: 106 MMHG | HEART RATE: 59 BPM | HEIGHT: 74 IN | DIASTOLIC BLOOD PRESSURE: 72 MMHG | RESPIRATION RATE: 16 BRPM | WEIGHT: 246 LBS | TEMPERATURE: 97 F | OXYGEN SATURATION: 97 % | BODY MASS INDEX: 31.57 KG/M2

## 2021-07-12 VITALS
SYSTOLIC BLOOD PRESSURE: 116 MMHG | RESPIRATION RATE: 4 BRPM | DIASTOLIC BLOOD PRESSURE: 72 MMHG | TEMPERATURE: 96.3 F | OXYGEN SATURATION: 96 %

## 2021-07-12 LAB
A/G RATIO: 1.8 (ref 1.1–2.2)
ABO/RH: NORMAL
ALBUMIN SERPL-MCNC: 4.2 G/DL (ref 3.4–5)
ALP BLD-CCNC: 74 U/L (ref 40–129)
ALT SERPL-CCNC: 12 U/L (ref 10–40)
ANION GAP SERPL CALCULATED.3IONS-SCNC: 9 MMOL/L (ref 3–16)
ANTIBODY SCREEN: NORMAL
AST SERPL-CCNC: 15 U/L (ref 15–37)
BASOPHILS ABSOLUTE: 0 K/UL (ref 0–0.2)
BASOPHILS RELATIVE PERCENT: 0.7 %
BILIRUB SERPL-MCNC: 0.4 MG/DL (ref 0–1)
BUN BLDV-MCNC: 22 MG/DL (ref 7–20)
CALCIUM SERPL-MCNC: 9.6 MG/DL (ref 8.3–10.6)
CHLORIDE BLD-SCNC: 107 MMOL/L (ref 99–110)
CO2: 24 MMOL/L (ref 21–32)
CREAT SERPL-MCNC: 1.2 MG/DL (ref 0.9–1.3)
EKG ATRIAL RATE: 68 BPM
EKG DIAGNOSIS: NORMAL
EKG P AXIS: 5 DEGREES
EKG P-R INTERVAL: 172 MS
EKG Q-T INTERVAL: 414 MS
EKG QRS DURATION: 96 MS
EKG QTC CALCULATION (BAZETT): 440 MS
EKG R AXIS: 31 DEGREES
EKG T AXIS: 5 DEGREES
EKG VENTRICULAR RATE: 68 BPM
EOSINOPHILS ABSOLUTE: 0.3 K/UL (ref 0–0.6)
EOSINOPHILS RELATIVE PERCENT: 5.5 %
GFR AFRICAN AMERICAN: >60
GFR NON-AFRICAN AMERICAN: >60
GLOBULIN: 2.4 G/DL
GLUCOSE BLD-MCNC: 107 MG/DL (ref 70–99)
HCT VFR BLD CALC: 46.3 % (ref 40.5–52.5)
HEMOGLOBIN: 15.2 G/DL (ref 13.5–17.5)
LYMPHOCYTES ABSOLUTE: 1.4 K/UL (ref 1–5.1)
LYMPHOCYTES RELATIVE PERCENT: 27 %
MAGNESIUM: 2 MG/DL (ref 1.8–2.4)
MCH RBC QN AUTO: 31.3 PG (ref 26–34)
MCHC RBC AUTO-ENTMCNC: 32.7 G/DL (ref 31–36)
MCV RBC AUTO: 95.6 FL (ref 80–100)
MONOCYTES ABSOLUTE: 0.4 K/UL (ref 0–1.3)
MONOCYTES RELATIVE PERCENT: 8.4 %
NEUTROPHILS ABSOLUTE: 3 K/UL (ref 1.7–7.7)
NEUTROPHILS RELATIVE PERCENT: 58.4 %
PDW BLD-RTO: 13.4 % (ref 12.4–15.4)
PLATELET # BLD: 189 K/UL (ref 135–450)
PMV BLD AUTO: 7.5 FL (ref 5–10.5)
POC ACT LR: 362 SEC
POC ACT LR: 387 SEC
POC ACT LR: >400 SEC
POC ACT LR: >400 SEC
POTASSIUM SERPL-SCNC: 4.5 MMOL/L (ref 3.5–5.1)
RBC # BLD: 4.85 M/UL (ref 4.2–5.9)
SODIUM BLD-SCNC: 140 MMOL/L (ref 136–145)
TOTAL PROTEIN: 6.6 G/DL (ref 6.4–8.2)
WBC # BLD: 5.2 K/UL (ref 4–11)

## 2021-07-12 PROCEDURE — 2580000003 HC RX 258: Performed by: NURSE ANESTHETIST, CERTIFIED REGISTERED

## 2021-07-12 PROCEDURE — C1894 INTRO/SHEATH, NON-LASER: HCPCS

## 2021-07-12 PROCEDURE — 93656 COMPRE EP EVAL ABLTJ ATR FIB: CPT

## 2021-07-12 PROCEDURE — 85025 COMPLETE CBC W/AUTO DIFF WBC: CPT

## 2021-07-12 PROCEDURE — 93662 INTRACARDIAC ECG (ICE): CPT | Performed by: INTERNAL MEDICINE

## 2021-07-12 PROCEDURE — 85347 COAGULATION TIME ACTIVATED: CPT

## 2021-07-12 PROCEDURE — 93325 DOPPLER ECHO COLOR FLOW MAPG: CPT

## 2021-07-12 PROCEDURE — 93623 PRGRMD STIMJ&PACG IV RX NFS: CPT | Performed by: INTERNAL MEDICINE

## 2021-07-12 PROCEDURE — 93613 INTRACARDIAC EPHYS 3D MAPG: CPT | Performed by: INTERNAL MEDICINE

## 2021-07-12 PROCEDURE — 93622 COMP EP EVAL L VENTR PAC&REC: CPT

## 2021-07-12 PROCEDURE — C1733 CATH, EP, OTHR THAN COOL-TIP: HCPCS

## 2021-07-12 PROCEDURE — 7100000000 HC PACU RECOVERY - FIRST 15 MIN

## 2021-07-12 PROCEDURE — 93656 COMPRE EP EVAL ABLTJ ATR FIB: CPT | Performed by: INTERNAL MEDICINE

## 2021-07-12 PROCEDURE — 93613 INTRACARDIAC EPHYS 3D MAPG: CPT

## 2021-07-12 PROCEDURE — 93662 INTRACARDIAC ECG (ICE): CPT

## 2021-07-12 PROCEDURE — 93320 DOPPLER ECHO COMPLETE: CPT

## 2021-07-12 PROCEDURE — C1760 CLOSURE DEV, VASC: HCPCS

## 2021-07-12 PROCEDURE — 2720000010 HC SURG SUPPLY STERILE

## 2021-07-12 PROCEDURE — 6360000002 HC RX W HCPCS

## 2021-07-12 PROCEDURE — 93010 ELECTROCARDIOGRAM REPORT: CPT | Performed by: INTERNAL MEDICINE

## 2021-07-12 PROCEDURE — 93622 COMP EP EVAL L VENTR PAC&REC: CPT | Performed by: INTERNAL MEDICINE

## 2021-07-12 PROCEDURE — 6370000000 HC RX 637 (ALT 250 FOR IP): Performed by: NURSE ANESTHETIST, CERTIFIED REGISTERED

## 2021-07-12 PROCEDURE — 86850 RBC ANTIBODY SCREEN: CPT

## 2021-07-12 PROCEDURE — 2580000003 HC RX 258

## 2021-07-12 PROCEDURE — 93623 PRGRMD STIMJ&PACG IV RX NFS: CPT

## 2021-07-12 PROCEDURE — C1759 CATH, INTRA ECHOCARDIOGRAPHY: HCPCS

## 2021-07-12 PROCEDURE — 80053 COMPREHEN METABOLIC PANEL: CPT

## 2021-07-12 PROCEDURE — 6360000002 HC RX W HCPCS: Performed by: NURSE ANESTHETIST, CERTIFIED REGISTERED

## 2021-07-12 PROCEDURE — C1769 GUIDE WIRE: HCPCS

## 2021-07-12 PROCEDURE — C1893 INTRO/SHEATH, FIXED,NON-PEEL: HCPCS

## 2021-07-12 PROCEDURE — 3700000000 HC ANESTHESIA ATTENDED CARE

## 2021-07-12 PROCEDURE — 3700000001 HC ADD 15 MINUTES (ANESTHESIA)

## 2021-07-12 PROCEDURE — C1766 INTRO/SHEATH,STRBLE,NON-PEEL: HCPCS

## 2021-07-12 PROCEDURE — 93005 ELECTROCARDIOGRAM TRACING: CPT | Performed by: INTERNAL MEDICINE

## 2021-07-12 PROCEDURE — 86901 BLOOD TYPING SEROLOGIC RH(D): CPT

## 2021-07-12 PROCEDURE — 36415 COLL VENOUS BLD VENIPUNCTURE: CPT

## 2021-07-12 PROCEDURE — 83735 ASSAY OF MAGNESIUM: CPT

## 2021-07-12 PROCEDURE — 2500000003 HC RX 250 WO HCPCS

## 2021-07-12 PROCEDURE — C1732 CATH, EP, DIAG/ABL, 3D/VECT: HCPCS

## 2021-07-12 PROCEDURE — 2709999900 HC NON-CHARGEABLE SUPPLY

## 2021-07-12 PROCEDURE — 86900 BLOOD TYPING SEROLOGIC ABO: CPT

## 2021-07-12 PROCEDURE — 2500000003 HC RX 250 WO HCPCS: Performed by: NURSE ANESTHETIST, CERTIFIED REGISTERED

## 2021-07-12 PROCEDURE — 93312 ECHO TRANSESOPHAGEAL: CPT

## 2021-07-12 PROCEDURE — 7100000001 HC PACU RECOVERY - ADDTL 15 MIN

## 2021-07-12 PROCEDURE — 6360000004 HC RX CONTRAST MEDICATION: Performed by: INTERNAL MEDICINE

## 2021-07-12 RX ORDER — HYDRALAZINE HYDROCHLORIDE 20 MG/ML
5 INJECTION INTRAMUSCULAR; INTRAVENOUS EVERY 10 MIN PRN
Status: DISCONTINUED | OUTPATIENT
Start: 2021-07-12 | End: 2021-07-12 | Stop reason: HOSPADM

## 2021-07-12 RX ORDER — OXYCODONE HYDROCHLORIDE AND ACETAMINOPHEN 5; 325 MG/1; MG/1
1 TABLET ORAL
Status: DISCONTINUED | OUTPATIENT
Start: 2021-07-12 | End: 2021-07-12 | Stop reason: HOSPADM

## 2021-07-12 RX ORDER — HYDROMORPHONE HCL 110MG/55ML
0.5 PATIENT CONTROLLED ANALGESIA SYRINGE INTRAVENOUS EVERY 5 MIN PRN
Status: DISCONTINUED | OUTPATIENT
Start: 2021-07-12 | End: 2021-07-12 | Stop reason: HOSPADM

## 2021-07-12 RX ORDER — SODIUM CHLORIDE 9 MG/ML
INJECTION, SOLUTION INTRAVENOUS CONTINUOUS PRN
Status: DISCONTINUED | OUTPATIENT
Start: 2021-07-12 | End: 2021-07-12 | Stop reason: SDUPTHER

## 2021-07-12 RX ORDER — PANTOPRAZOLE SODIUM 40 MG/1
40 TABLET, DELAYED RELEASE ORAL
Qty: 30 TABLET | Refills: 0 | Status: SHIPPED | OUTPATIENT
Start: 2021-07-12 | End: 2021-10-11

## 2021-07-12 RX ORDER — MIDAZOLAM HYDROCHLORIDE 1 MG/ML
INJECTION INTRAMUSCULAR; INTRAVENOUS PRN
Status: DISCONTINUED | OUTPATIENT
Start: 2021-07-12 | End: 2021-07-12 | Stop reason: SDUPTHER

## 2021-07-12 RX ORDER — ONDANSETRON 2 MG/ML
4 INJECTION INTRAMUSCULAR; INTRAVENOUS
Status: DISCONTINUED | OUTPATIENT
Start: 2021-07-12 | End: 2021-07-12 | Stop reason: HOSPADM

## 2021-07-12 RX ORDER — FUROSEMIDE 10 MG/ML
INJECTION INTRAMUSCULAR; INTRAVENOUS PRN
Status: DISCONTINUED | OUTPATIENT
Start: 2021-07-12 | End: 2021-07-12

## 2021-07-12 RX ORDER — PROTAMINE SULFATE 10 MG/ML
INJECTION, SOLUTION INTRAVENOUS PRN
Status: DISCONTINUED | OUTPATIENT
Start: 2021-07-12 | End: 2021-07-12 | Stop reason: SDUPTHER

## 2021-07-12 RX ORDER — ONDANSETRON 2 MG/ML
INJECTION INTRAMUSCULAR; INTRAVENOUS PRN
Status: DISCONTINUED | OUTPATIENT
Start: 2021-07-12 | End: 2021-07-12 | Stop reason: SDUPTHER

## 2021-07-12 RX ORDER — GLYCOPYRROLATE 0.2 MG/ML
INJECTION INTRAMUSCULAR; INTRAVENOUS PRN
Status: DISCONTINUED | OUTPATIENT
Start: 2021-07-12 | End: 2021-07-12 | Stop reason: SDUPTHER

## 2021-07-12 RX ORDER — HEPARIN SODIUM 1000 [USP'U]/ML
INJECTION, SOLUTION INTRAVENOUS; SUBCUTANEOUS PRN
Status: DISCONTINUED | OUTPATIENT
Start: 2021-07-12 | End: 2021-07-12 | Stop reason: SDUPTHER

## 2021-07-12 RX ORDER — DEXAMETHASONE SODIUM PHOSPHATE 4 MG/ML
INJECTION, SOLUTION INTRA-ARTICULAR; INTRALESIONAL; INTRAMUSCULAR; INTRAVENOUS; SOFT TISSUE PRN
Status: DISCONTINUED | OUTPATIENT
Start: 2021-07-12 | End: 2021-07-12 | Stop reason: SDUPTHER

## 2021-07-12 RX ORDER — SODIUM CHLORIDE 9 MG/ML
25 INJECTION, SOLUTION INTRAVENOUS PRN
Status: DISCONTINUED | OUTPATIENT
Start: 2021-07-12 | End: 2021-07-12 | Stop reason: HOSPADM

## 2021-07-12 RX ORDER — ACETAMINOPHEN 325 MG/1
650 TABLET ORAL EVERY 4 HOURS PRN
Status: DISCONTINUED | OUTPATIENT
Start: 2021-07-12 | End: 2021-07-12 | Stop reason: HOSPADM

## 2021-07-12 RX ORDER — LIDOCAINE HYDROCHLORIDE 20 MG/ML
SOLUTION OROPHARYNGEAL PRN
Status: DISCONTINUED | OUTPATIENT
Start: 2021-07-12 | End: 2021-07-12 | Stop reason: SDUPTHER

## 2021-07-12 RX ORDER — LABETALOL HYDROCHLORIDE 5 MG/ML
5 INJECTION, SOLUTION INTRAVENOUS EVERY 10 MIN PRN
Status: DISCONTINUED | OUTPATIENT
Start: 2021-07-12 | End: 2021-07-12 | Stop reason: HOSPADM

## 2021-07-12 RX ORDER — FENTANYL CITRATE 50 UG/ML
INJECTION, SOLUTION INTRAMUSCULAR; INTRAVENOUS PRN
Status: DISCONTINUED | OUTPATIENT
Start: 2021-07-12 | End: 2021-07-12 | Stop reason: SDUPTHER

## 2021-07-12 RX ORDER — SODIUM CHLORIDE 0.9 % (FLUSH) 0.9 %
5-40 SYRINGE (ML) INJECTION EVERY 12 HOURS SCHEDULED
Status: DISCONTINUED | OUTPATIENT
Start: 2021-07-12 | End: 2021-07-12 | Stop reason: HOSPADM

## 2021-07-12 RX ORDER — LIDOCAINE HYDROCHLORIDE 20 MG/ML
INJECTION, SOLUTION INFILTRATION; PERINEURAL PRN
Status: DISCONTINUED | OUTPATIENT
Start: 2021-07-12 | End: 2021-07-12 | Stop reason: SDUPTHER

## 2021-07-12 RX ORDER — SODIUM CHLORIDE 0.9 % (FLUSH) 0.9 %
5-40 SYRINGE (ML) INJECTION PRN
Status: DISCONTINUED | OUTPATIENT
Start: 2021-07-12 | End: 2021-07-12 | Stop reason: HOSPADM

## 2021-07-12 RX ORDER — PROPOFOL 10 MG/ML
INJECTION, EMULSION INTRAVENOUS PRN
Status: DISCONTINUED | OUTPATIENT
Start: 2021-07-12 | End: 2021-07-12 | Stop reason: SDUPTHER

## 2021-07-12 RX ORDER — MEPERIDINE HYDROCHLORIDE 25 MG/ML
12.5 INJECTION INTRAMUSCULAR; INTRAVENOUS; SUBCUTANEOUS EVERY 5 MIN PRN
Status: DISCONTINUED | OUTPATIENT
Start: 2021-07-12 | End: 2021-07-12 | Stop reason: HOSPADM

## 2021-07-12 RX ORDER — SODIUM CHLORIDE 0.9 % (FLUSH) 0.9 %
SYRINGE (ML) INJECTION PRN
Status: DISCONTINUED | OUTPATIENT
Start: 2021-07-12 | End: 2021-07-12 | Stop reason: SDUPTHER

## 2021-07-12 RX ORDER — SUCCINYLCHOLINE CHLORIDE 20 MG/ML
INJECTION INTRAMUSCULAR; INTRAVENOUS PRN
Status: DISCONTINUED | OUTPATIENT
Start: 2021-07-12 | End: 2021-07-12 | Stop reason: SDUPTHER

## 2021-07-12 RX ORDER — VECURONIUM BROMIDE 1 MG/ML
INJECTION, POWDER, LYOPHILIZED, FOR SOLUTION INTRAVENOUS PRN
Status: DISCONTINUED | OUTPATIENT
Start: 2021-07-12 | End: 2021-07-12 | Stop reason: SDUPTHER

## 2021-07-12 RX ORDER — FUROSEMIDE 10 MG/ML
INJECTION INTRAMUSCULAR; INTRAVENOUS PRN
Status: DISCONTINUED | OUTPATIENT
Start: 2021-07-12 | End: 2021-07-12 | Stop reason: SDUPTHER

## 2021-07-12 RX ADMIN — DEXAMETHASONE SODIUM PHOSPHATE 4 MG: 4 INJECTION, SOLUTION INTRAMUSCULAR; INTRAVENOUS at 07:37

## 2021-07-12 RX ADMIN — PROPOFOL 200 MG: 10 INJECTION, EMULSION INTRAVENOUS at 07:41

## 2021-07-12 RX ADMIN — PHENYLEPHRINE HYDROCHLORIDE 100 MCG: 10 INJECTION INTRAVENOUS at 09:05

## 2021-07-12 RX ADMIN — LIDOCAINE HYDROCHLORIDE 100 MG: 20 INJECTION, SOLUTION INFILTRATION; PERINEURAL at 07:39

## 2021-07-12 RX ADMIN — PHENYLEPHRINE HYDROCHLORIDE 100 MCG: 10 INJECTION INTRAVENOUS at 08:14

## 2021-07-12 RX ADMIN — PHENYLEPHRINE HYDROCHLORIDE 100 MCG: 10 INJECTION INTRAVENOUS at 08:10

## 2021-07-12 RX ADMIN — GLYCOPYRROLATE 0.2 MG: 0.2 INJECTION, SOLUTION INTRAMUSCULAR; INTRAVENOUS at 07:38

## 2021-07-12 RX ADMIN — PHENYLEPHRINE HYDROCHLORIDE 100 MCG: 10 INJECTION INTRAVENOUS at 08:34

## 2021-07-12 RX ADMIN — FUROSEMIDE 5 MG: 10 INJECTION, SOLUTION INTRAMUSCULAR; INTRAVENOUS at 09:20

## 2021-07-12 RX ADMIN — PHENYLEPHRINE HYDROCHLORIDE 100 MCG: 10 INJECTION INTRAVENOUS at 08:01

## 2021-07-12 RX ADMIN — PROPOFOL 20 MG: 10 INJECTION, EMULSION INTRAVENOUS at 09:27

## 2021-07-12 RX ADMIN — PROPOFOL 30 MG: 10 INJECTION, EMULSION INTRAVENOUS at 09:11

## 2021-07-12 RX ADMIN — PHENYLEPHRINE HYDROCHLORIDE 50 MCG/MIN: 10 INJECTION INTRAVENOUS at 07:45

## 2021-07-12 RX ADMIN — PHENYLEPHRINE HYDROCHLORIDE 100 MCG: 10 INJECTION INTRAVENOUS at 07:49

## 2021-07-12 RX ADMIN — SODIUM CHLORIDE: 9 INJECTION, SOLUTION INTRAVENOUS at 07:34

## 2021-07-12 RX ADMIN — PHENYLEPHRINE HYDROCHLORIDE 100 MCG: 10 INJECTION INTRAVENOUS at 07:45

## 2021-07-12 RX ADMIN — VECURONIUM BROMIDE 3 MG: 1 INJECTION, POWDER, LYOPHILIZED, FOR SOLUTION INTRAVENOUS at 07:39

## 2021-07-12 RX ADMIN — FUROSEMIDE 5 MG: 10 INJECTION, SOLUTION INTRAMUSCULAR; INTRAVENOUS at 08:17

## 2021-07-12 RX ADMIN — ISOPROTERENOL HYDROCHLORIDE 10 MCG/MIN: 0.2 INJECTION, SOLUTION INTRAMUSCULAR; INTRAVENOUS at 09:52

## 2021-07-12 RX ADMIN — SODIUM CHLORIDE: 9 INJECTION, SOLUTION INTRAVENOUS at 08:35

## 2021-07-12 RX ADMIN — PROPOFOL 40 MG: 10 INJECTION, EMULSION INTRAVENOUS at 09:15

## 2021-07-12 RX ADMIN — PHENYLEPHRINE HYDROCHLORIDE 100 MCG: 10 INJECTION INTRAVENOUS at 07:57

## 2021-07-12 RX ADMIN — SUGAMMADEX 200 MG: 100 INJECTION, SOLUTION INTRAVENOUS at 09:57

## 2021-07-12 RX ADMIN — PHENYLEPHRINE HYDROCHLORIDE 100 MCG: 10 INJECTION INTRAVENOUS at 08:30

## 2021-07-12 RX ADMIN — GLYCOPYRROLATE 0.4 MG: 0.2 INJECTION, SOLUTION INTRAMUSCULAR; INTRAVENOUS at 08:14

## 2021-07-12 RX ADMIN — PHENYLEPHRINE HYDROCHLORIDE 100 MCG: 10 INJECTION INTRAVENOUS at 09:12

## 2021-07-12 RX ADMIN — PHENYLEPHRINE HYDROCHLORIDE 100 MCG: 10 INJECTION INTRAVENOUS at 08:50

## 2021-07-12 RX ADMIN — FUROSEMIDE 5 MG: 10 INJECTION, SOLUTION INTRAMUSCULAR; INTRAVENOUS at 09:02

## 2021-07-12 RX ADMIN — PROPOFOL 30 MG: 10 INJECTION, EMULSION INTRAVENOUS at 09:07

## 2021-07-12 RX ADMIN — LIDOCAINE HYDROCHLORIDE 4 ML: 20 SOLUTION ORAL; TOPICAL at 07:43

## 2021-07-12 RX ADMIN — HEPARIN SODIUM 3000 UNITS: 1000 INJECTION INTRAVENOUS; SUBCUTANEOUS at 09:11

## 2021-07-12 RX ADMIN — PHENYLEPHRINE HYDROCHLORIDE 100 MCG: 10 INJECTION INTRAVENOUS at 07:53

## 2021-07-12 RX ADMIN — FENTANYL CITRATE 50 MCG: 50 INJECTION, SOLUTION INTRAMUSCULAR; INTRAVENOUS at 08:25

## 2021-07-12 RX ADMIN — Medication 10 ML: at 07:34

## 2021-07-12 RX ADMIN — PHENYLEPHRINE HYDROCHLORIDE 100 MCG: 10 INJECTION INTRAVENOUS at 07:39

## 2021-07-12 RX ADMIN — IOPAMIDOL 35 ML: 755 INJECTION, SOLUTION INTRAVENOUS at 10:05

## 2021-07-12 RX ADMIN — HEPARIN SODIUM 15000 UNITS: 1000 INJECTION INTRAVENOUS; SUBCUTANEOUS at 08:30

## 2021-07-12 RX ADMIN — MIDAZOLAM 2 MG: 1 INJECTION INTRAMUSCULAR; INTRAVENOUS at 07:35

## 2021-07-12 RX ADMIN — PROPOFOL 40 MG: 10 INJECTION, EMULSION INTRAVENOUS at 09:33

## 2021-07-12 RX ADMIN — PHENYLEPHRINE HYDROCHLORIDE 100 MCG: 10 INJECTION INTRAVENOUS at 08:05

## 2021-07-12 RX ADMIN — FENTANYL CITRATE 50 MCG: 50 INJECTION, SOLUTION INTRAMUSCULAR; INTRAVENOUS at 07:40

## 2021-07-12 RX ADMIN — ONDANSETRON 4 MG: 2 INJECTION INTRAMUSCULAR; INTRAVENOUS at 07:52

## 2021-07-12 RX ADMIN — PROTAMINE SULFATE 40 MG: 10 INJECTION, SOLUTION INTRAVENOUS at 10:03

## 2021-07-12 RX ADMIN — PHENYLEPHRINE HYDROCHLORIDE 100 MCG: 10 INJECTION INTRAVENOUS at 08:54

## 2021-07-12 RX ADMIN — FUROSEMIDE 5 MG: 10 INJECTION, SOLUTION INTRAMUSCULAR; INTRAVENOUS at 08:40

## 2021-07-12 RX ADMIN — SUCCINYLCHOLINE CHLORIDE 200 MG: 20 INJECTION, SOLUTION INTRAMUSCULAR; INTRAVENOUS at 07:42

## 2021-07-12 RX ADMIN — PROPOFOL 40 MG: 10 INJECTION, EMULSION INTRAVENOUS at 09:30

## 2021-07-12 RX ADMIN — WATER 10 ML: 1 INJECTION INTRAMUSCULAR; INTRAVENOUS; SUBCUTANEOUS at 07:41

## 2021-07-12 ASSESSMENT — PULMONARY FUNCTION TESTS
PIF_VALUE: 18
PIF_VALUE: 18
PIF_VALUE: 17
PIF_VALUE: 17
PIF_VALUE: 19
PIF_VALUE: 17
PIF_VALUE: 18
PIF_VALUE: 17
PIF_VALUE: 18
PIF_VALUE: 4
PIF_VALUE: 1
PIF_VALUE: 19
PIF_VALUE: 14
PIF_VALUE: 18
PIF_VALUE: 14
PIF_VALUE: 17
PIF_VALUE: 13
PIF_VALUE: 1
PIF_VALUE: 18
PIF_VALUE: 22
PIF_VALUE: 17
PIF_VALUE: 17
PIF_VALUE: 19
PIF_VALUE: 18
PIF_VALUE: 1
PIF_VALUE: 14
PIF_VALUE: 17
PIF_VALUE: 18
PIF_VALUE: 18
PIF_VALUE: 5
PIF_VALUE: 18
PIF_VALUE: 4
PIF_VALUE: 18
PIF_VALUE: 19
PIF_VALUE: 18
PIF_VALUE: 17
PIF_VALUE: 17
PIF_VALUE: 18
PIF_VALUE: 17
PIF_VALUE: 1
PIF_VALUE: 17
PIF_VALUE: 17
PIF_VALUE: 9
PIF_VALUE: 20
PIF_VALUE: 17
PIF_VALUE: 1
PIF_VALUE: 4
PIF_VALUE: 17
PIF_VALUE: 13
PIF_VALUE: 17
PIF_VALUE: 18
PIF_VALUE: 2
PIF_VALUE: 17
PIF_VALUE: 19
PIF_VALUE: 0
PIF_VALUE: 9
PIF_VALUE: 1
PIF_VALUE: 18
PIF_VALUE: 1
PIF_VALUE: 17
PIF_VALUE: 18
PIF_VALUE: 9
PIF_VALUE: 6
PIF_VALUE: 18
PIF_VALUE: 19
PIF_VALUE: 18
PIF_VALUE: 18
PIF_VALUE: 17
PIF_VALUE: 18
PIF_VALUE: 17
PIF_VALUE: 18
PIF_VALUE: 18
PIF_VALUE: 17
PIF_VALUE: 1
PIF_VALUE: 2
PIF_VALUE: 18
PIF_VALUE: 17
PIF_VALUE: 18
PIF_VALUE: 3
PIF_VALUE: 5
PIF_VALUE: 9
PIF_VALUE: 18
PIF_VALUE: 18
PIF_VALUE: 19
PIF_VALUE: 17
PIF_VALUE: 17
PIF_VALUE: 18
PIF_VALUE: 18
PIF_VALUE: 4
PIF_VALUE: 18
PIF_VALUE: 17
PIF_VALUE: 18
PIF_VALUE: 4
PIF_VALUE: 17
PIF_VALUE: 18
PIF_VALUE: 17
PIF_VALUE: 33
PIF_VALUE: 9
PIF_VALUE: 26
PIF_VALUE: 17
PIF_VALUE: 18
PIF_VALUE: 3
PIF_VALUE: 17
PIF_VALUE: 17
PIF_VALUE: 18
PIF_VALUE: 18
PIF_VALUE: 19
PIF_VALUE: 0
PIF_VALUE: 18
PIF_VALUE: 2
PIF_VALUE: 19
PIF_VALUE: 18
PIF_VALUE: 3
PIF_VALUE: 18
PIF_VALUE: 17
PIF_VALUE: 18
PIF_VALUE: 17
PIF_VALUE: 18
PIF_VALUE: 14
PIF_VALUE: 14
PIF_VALUE: 9
PIF_VALUE: 18
PIF_VALUE: 1
PIF_VALUE: 17
PIF_VALUE: 26
PIF_VALUE: 9
PIF_VALUE: 19
PIF_VALUE: 18
PIF_VALUE: 9
PIF_VALUE: 17
PIF_VALUE: 18
PIF_VALUE: 19
PIF_VALUE: 17
PIF_VALUE: 0
PIF_VALUE: 17
PIF_VALUE: 18
PIF_VALUE: 18
PIF_VALUE: 17
PIF_VALUE: 18
PIF_VALUE: 17
PIF_VALUE: 2
PIF_VALUE: 17
PIF_VALUE: 18
PIF_VALUE: 18

## 2021-07-12 NOTE — H&P
[facility]   Electrophysiology        Chief Complaint   Patient presents with    Follow-up    Palpitations     occassional palpitations; thinks medication has helped        HPI: Angelo Real is a 49 y.o. male with a PMH of graves disease, Bipolar disorder and ADD. He was seen by cardiologist Dr. Kaylyn Falcon at [facility] for evaluation of palpitations and near syncope on 5/11/2020. Stress test and Echo were completed sand were unremarkable. He had stopped his stimulant for ADHD and started Toprol XL and per 's last note was seeing improvement in symptoms. He presented to his endocrinologists office 2/2021 with continued complaints of palpitations. His thyroid disease appears stable at this time with TSH in normal range. Larayne Meckel presents to the office in follow up. He states that on Verapamil ( although he is not sure that he is taking the diltiazem or verapamil at home. )He has noticed a decrease in his symptoms. Now attests to just having occasional palpitations. Discussed further treatment options including antiarrhythmics vs ablation. He is still has bad days 4 out of 7 but he is not sure if it is a side effect of medicine or atrial fibrillation. Past Medical History:   Diagnosis Date    A-fib [facility])     ADD (attention deficit disorder)     Back pain     Bipolar disorder (HCC)     Bipolar disorder ([facility] [address]     Goiter     Graves disease     Headache(784.0)     Hyperthyroidism     Thyroid disease         History reviewed. No pertinent surgical history. Allergies:  No Known Allergies    Social History:   reports that he has quit smoking. He has a 4.75 pack-year smoking history. He has never used smokeless tobacco. He reports current drug use. Drug: Marijuana. He reports that he does not drink alcohol.      Family History:  family history includes Arthritis in his mother; Cancer in his father, mother, paternal grandfather, and paternal grandmother; High Blood Pressure in his father; High Cholesterol in his father; Stroke in his paternal grandfather; Vision Loss in his father. Reviewed. Denies family history of sudden cardiac death, arrhythmia, premature CAD    Review of System:  All other systems reviewed and are negative except for that noted above. Pertinent negatives are:   General: negative for fever, chills   Ophthalmic ROS: negative for - eye pain or loss of vision  ENT ROS: negative for - headaches, sore throat   Respiratory: negative for - cough, sputum  Cardiovascular: Reviewed in HPI  Gastrointestinal: negative for - abdominal pain, diarrhea, N/V  Hematology: negative for - bleeding, blood clots, bruising or jaundice  Genito-Urinary:  negative for - Dysuria or incontinence  Musculoskeletal: negative for - Joint swelling, muscle pain  Neurological: negative for - confusion, dizziness, headaches   Psychiatric: No anxiety, no depression. Dermatological: negative for - rash    Physical Examination:  Vitals:    04/22/21 0850   BP: 113/77   Pulse: 61   SpO2: 97%      Wt Readings from Last 3 Encounters:   04/22/21 246 lb 3.2 oz (111.7 kg)   03/04/21 234 lb 3.2 oz (106.2 kg)   10/22/20 222 lb 6.4 oz (100.9 kg)       · Constitutional: Oriented. No distress. · Head: Normocephalic and atraumatic. · Mouth/Throat: Oropharynx is clear and moist.   · Eyes: Conjunctivae normal. EOM are normal.   · Neck: Neck supple. No rigidity. No JVD present. · Cardiovascular: Normal rate, regular rhythm, S1&S2. · Pulmonary/Chest: Bilateral respiratory sounds. No wheezes, No rhonchi. · Abdominal: Soft. Bowel sounds present. No distension, No tenderness. · Musculoskeletal: No tenderness. No edema    · Lymphadenopathy: Has no cervical adenopathy. · Neurological: Alert and oriented. Cranial nerve appears intact, No Gross deficit   · Skin: Skin is warm and dry. No rash noted. · Psychiatric: Has a normal behavior       Labs, diagnostic and imaging results reviewed. Reviewed.    Lab Results Component Value Date    TSH 4.55 2021    TSH 1.52 10/22/2020    CREATININE 1.10 2020    CREATININE 0.99 2020    AST 16 2020    ALT 16 2020       EC21   Sinus bradycardia     MCOT 3/4/2021  4% Afib burden, Symptoms with Sinus and AFib    Stress test 2020 [facility])   Interpetation Summary:      o Negative ECG for ischemia with graded exercise test.      o Duke Treadmill Score is 7 which indicates low risk.           Echo: 2020  Summary:    The left ventricular wall motion is normal.    The mitral inflow pattern is consistent with Diastolic Dysfunction.    There is mild concentric left ventricular hypertrophy.    The left atrium is mildly dilated.    Left ventricular systolic function is normal. (LVEF>/=55%)    Overall left ventricular ejection fraction is estimated to be 55-60%.    A contrast agent was used to demonstrate all left ventricular wall segments.        Cath:     Medication:  Current Outpatient Medications   Medication Sig Dispense Refill    methIMAzole (TAPAZOLE) 5 MG tablet TAKE 1 TABLET BY MOUTH DAILY 90 tablet 1    verapamil (CALAN SR) 120 MG extended release tablet Take 1 tablet by mouth daily 90 tablet 3    metoprolol succinate (TOPROL XL) 25 MG extended release tablet TAKE 1 TABLET BY MOUTH EVERY DAY 30 tablet 5    losartan (COZAAR) 50 MG tablet TAKE 1 TABLET BY MOUTH EVERY DAY IN THE MORNING      lithium (ESKALITH) 450 MG extended release tablet daily   2    benztropine (COGENTIN) 2 MG tablet 1 mg 2 times daily       ziprasidone (GEODON) 80 MG capsule Take 80 mg by mouth daily       VYVANSE 20 MG CAPS TAKE 1 CAPSULE BY MOUTH EVERY DAY       No current facility-administered medications for this visit.         Assessment and plan:     PAF   -ECG today shows Sinus Bradycardia   -30 day monitor 3/4/2021 revealed PAF with a burden of 4%   -Discussion of continue current treatment, antiarrhythmic therapy or ablation.   -He is young and antiarrhythmic therapy would not be a good long term option. He states he continues to have symptoms 2-3 days a week and discussed that ablation would be his best option   -Antiarrhythmic therapy, side effects, benefits and alternative discussed.     -Atrial fibrillation ablation procedure was discussed. We discussed the need for repeat procedure. On average patients may need more than one ablation procedure.     -Risks associated with ablation include but not limited to allergic reaction to the medications, pain, bleeding, infection, nerve injury, injury to diaphragm(breathing muscle), pulmonary embolus(blood clot in lungs), deep vein blood clot, pneumothorax, hemothorax, acute renal failure, cardiac perforation,  tamponade, need for emergent surgery (open heart), permanent pacemaker, pulmonary vein stenosis, left atrial to esophageal fistula, stroke, myocardial infarction and death. Difference between atrial fibrillation and atrial flutter discussed and treatment discussed.    -He would like to think about an ablation and if he decides we will schedule for a cryo-ablation   -Referal to sleep medicine    -He is on both verapamil and diltiazem. I do not find it necessary and therefore will increase his verapamil to 240 m g daily and stop diltiazem. -encouraged other modifications including weight loss and encouraged hydration. -on Toprol XL 25 mg daily   -ECHO and stress 6/2020 were unremarkable   -TSH is stable at this time   -1/25/2020 Normetanephrine, Free 0.48  Metanephrine, Free 0.17 (WNL)      -He has a [id number] score of possibly 0. He is on losartan but he does not have any history of documented hypertension. At this point his risk of a stroke seems to be very low. Anxiety seems to be a big part of his symptoms. I discussed use of antiarrhythmic drugs or continuation with rate control management or ablation. He will think about it and let us know.     Obesity  Body mass index is 31.61 kg/m².   - Excessive weight is complicating assessment and treatment. It is placing patient at risk for multiple co-morbidities as well as early death and contributing to the patient's presentation.    - discussed weight management with diet and exercise          Graves disease   -Followed by    -TSH 2/25/2021 4.55   -Tapazole 5 mg daily 5-6 days a week dependent on TSH      Plan     cryo-ablation of atrial fibrillation

## 2021-07-12 NOTE — ANESTHESIA POSTPROCEDURE EVALUATION
Department of Anesthesiology  Postprocedure Note    Patient: Radhames Bender  MRN: [id number]  YOB: 1970  Date of evaluation: 7/12/2021  Time:  11:27 AM     Procedure Summary     Date: 07/12/21 Room / Location: [facility] Cath Lab; [facility] Echocardiography    Anesthesia Start: 0151 Anesthesia Stop: 1781    Procedure: ECHO WILLIE IN CARDIAC CATH Diagnosis: Paroxysmal atrial fibrillation    Scheduled Providers:  Responsible Provider: Jeremy Otoole MD    Anesthesia Type: general ASA Status: 3          Anesthesia Type: general    Miranda Phase I: Miranda Score: 10    Miranda Phase II:      Last vitals: Reviewed and per EMR flowsheets.        Anesthesia Post Evaluation    Patient location during evaluation: PACU  Patient participation: complete - patient participated  Level of consciousness: awake  Airway patency: patent  Nausea & Vomiting: no nausea and no vomiting  Complications: no  Cardiovascular status: blood pressure returned to baseline  Respiratory status: acceptable  Hydration status: euvolemic

## 2021-07-12 NOTE — PROGRESS NOTES
Patient awake, alert, VSS, NSR, right groin soft, no hematoma, dressing CDI, RLE neurovascular checks WDL, phase I discharge criteria met, will transfer back to cath lab.

## 2021-07-12 NOTE — PROCEDURES
[address]     Electrophysiology Procedure Note       Date of Procedure: 7/12/2021  Patient's Name: Meghann Miramontes  YOB: 1970   Medical Record Number: [id number]  Referring Physician: Dorita Tejeda MD  Procedure Performed by: Gabby Winslow MD     ·           Transesophageal echocardiography  ·           Electrophysiology study witht left atrial recording and mapping   . Left atrial recording and mapping via CS  . electrophysiological study with attempt at induction after IV drug infusion  ·           3-D electroanatomical mapping of the left atrium. ·           Transseptal puncture through an intact septum. ·           Intracardiac echocardiography. ·              ·           Cryo-ablation of atrial fibrillation with pulmonary vein isolation   . Left ventricular pacing and sensing     Indications for procedure:  Symptomatic paroxysmal atrial fibrillation   Patient is 56y/o male with frequent paroxysmal atrial fibrillation and symptomatic who has not responded to beta-blocker  and  antiarrhythmic drugs  and /or has side efects of antiarrhythmic drugs     Details of procedure: The risks, benefits and alternatives of the ablation procedure were discussed with the patient. The risks including, but not limited to, the risks of bleeding, infection, radiation exposure, injury to vascular, cardiac and surrounding structures (including pneumothorax), esophageal injury, stroke, cardiac perforation, tamponade, need for emergent open heart surgery, need for pacemaker implantation, myocardial infarction and death were discussed in detail. The patient opted to proceed with the ablation. Written informed consent was signed and placed in the chart. Patient was brought to the EP lab in a fasting non-sedate state. Patient underwent general anesthesia by anesthesia team. We initially tried to perform a transesophageal echo but were unsuccessful in passing the probe.  Therefore since he was in sinus rhythm, we proceeded with first doing ICE to assess LA appendage before proceeding to rest of procedure as described below. . Both groins were prepped in a sterile fashion. We gained access in right femoral veins. A 9-Tristanian sheath for ICE and 5F sheath for CS catheter was placed in the right femoral vein using modified seldinger technique. Ultrasound was used for femoral venous access. We gained access   modified Seldinger technique and ultrasound guidance. The femoral vein was identified with real time visualization of needle passage to the venous lumen. Then a CS cathter was placed inside the coronary sinus under fluoroscopy for recording  and mapping of the left atrium. Intracardiac echocardiography (ICE) catheter was advanced under fluoroscopy to the in the right atrium . Using ICE we delineated the left pulmonary vein, left atrial appendage,  mitral valve, and right superior and right inferior pulmonary veins using 3-D mapping system Carto . Patient received a bolus of heparin prior transseptal puncture followed by continuous monitoring of the ACT and boluses of heparin during the procedure to keep the ACT more than 300. Transseptal punctures through intact septum were done using ICE a. We placed SR0 sheath inside the left atrium. The sheath was exchanged over the wire with the FlexCath sheath     3-D map of LA was created using Penta array catheter with voltage map of LA. Then we advanced the \"Arctic Front Cryo-Balloon\" and \"Achieve\" inside the sheath and placed it inside the left atrium. A 3D  mapping of the left atrium including ostium of all pulmonary vein, using Carto mapping system was created. A voltage map before and after ablation was done     During cryoablation, angiography of the left and right pulmonary veins was performed to deliniate the pulmonary veins ostium. Using ICE we also confirmed the position of lasso catheter at the ostium of pulmonary veins.   During ablation careful monitoring of esophageal temperature was done to prevent injury to esophagus. Then we proceeded with isolation of the left sided pulmonary veins with cryoablation. The achieve catheter was advanced inside the left superior pulmonary vein. After the inflation of the balloon, the location of it was confirmed using ICE and contrast injection to confirm complete occlusion and close contact between the balloon and pulmonary vein ostium. We placed  1cryoablation lesions on the LSPV ostium . Then cryoablation 2 lesions was placed on the LIPV ostium(180 sec, . For isolation of right sided pulmonary veins, CS catheter was placed in the SVC for high output pacing of the right phrenic nerve. During cryoablation of the right sided pulmonary veins, phrenic nerve was continuously monitored by pacing it and checking diaphragmatic contractions. 2  Cryoablation lesions on the RIPV  was placed, each 150 s   1 Cryoablation lesions were placed on the RSPV, 180 sec. 1 cryoablation lesions was placed in right middle lobe pulmonary vein branch 180 seconds    All veins showed pulmonary vein potentials which were isolated under 1 minute except for the right inferior pulmonary vein which took longer and therefore 2 lesions were applied. Due to drop in the temperature and esophagus we applied shorter lesions in the right inferior pulmonary vein. Following ablation complete isolation of all four pulmonary veins were confirmed using circular cathter. Both entrance and exit block was confirmed. For left sided pulmonary vein we used pacing from distal CS catheter to check isolation of the vein and entrance block. Pacing through the circular catheter poles inside the vein using high current, failed to conduct to the atrium confirming exit block on all four pulmonary veins.           Following confirmation of pulmonary veins isolation by circular catheter, isuprel IV was started and increased to 10 mcg to check

## 2021-07-12 NOTE — ANESTHESIA PRE PROCEDURE
Department of Anesthesiology  Preprocedure Note       Name:  Amado Lazcano   Age:  49 y.o.  :  1970                                          MRN:  [id number]         Date:  2021      Surgeon: * Surgery not found *    Procedure:     Medications prior to admission:   Prior to Admission medications    Medication Sig Start Date End Date Taking? Authorizing Provider   metoprolol succinate (TOPROL XL) 25 MG extended release tablet TAKE 1 TABLET BY MOUTH EVERY DAY 6/10/21   Torres Weller MD   LORazepam (ATIVAN) 1 MG tablet TAKE 1 TABLET BY MOUTH EVERY DAY AS NEEDED 21   Historical Provider, MD   verapamil (CALAN SR) 240 MG extended release tablet Take 1 tablet by mouth daily 21   Maninder Reeder MD   methIMAzole (TAPAZOLE) 5 MG tablet TAKE 1 TABLET BY MOUTH DAILY 3/8/21   Torres Weller MD   losartan (COZAAR) 50 MG tablet TAKE 1 TABLET BY MOUTH EVERY DAY IN THE MORNING 20   Historical Provider, MD   lithium (ESKALITH) 450 MG extended release tablet Take 450 mg by mouth daily  18   Historical Provider, MD   benztropine (COGENTIN) 2 MG tablet 1 mg 2 times daily  18   Historical Provider, MD   ziprasidone (GEODON) 80 MG capsule Take 80 mg by mouth daily  18   Historical Provider, MD       Current medications:    No current facility-administered medications for this encounter.        Allergies:  No Known Allergies    Problem List:    Patient Active Problem List   Diagnosis Code    Goiter E04.9    Thyroid disease E07.9    Hyperthyroidism E05.90    Back pain M54.9    Bipolar disorder ([facility] [address] F31.9    Occupational exposure to pesticides Z77.128    Occupational exposure to chemicals Z77.098    Palpitations R00.2    Glucose intolerance E74.39    Borderline systolic HTN A93.9    Low libido R68.82    PAF (paroxysmal atrial fibrillation) (HCC) I48.0    Attention deficit disorder (ADD) in adult F98.8    Graves' disease with thyrotoxic storm E05.01    Near syncope R55       Past Medical History:        Diagnosis Date    A-fib [facility])     ADD (attention deficit disorder)     Back pain     Bipolar disorder (HCC)     Bipolar disorder ([facility] [address]     Goiter     Graves disease     Headache(784.0)     Hyperthyroidism     Thyroid disease        Past Surgical History:  No past surgical history on file. Social History:    Social History     Tobacco Use    Smoking status: Former Smoker     Packs/day: 0.25     Years: 19.00     Pack years: 4.75    Smokeless tobacco: Never Used   Substance Use Topics    Alcohol use: No                                Counseling given: Not Answered      Vital Signs (Current): There were no vitals filed for this visit. BP Readings from Last 3 Encounters:   05/20/21 122/81   04/22/21 113/77   03/04/21 129/85       NPO Status:                                                                                 BMI:   Wt Readings from Last 3 Encounters:   05/20/21 243 lb 12.8 oz (110.6 kg)   04/22/21 246 lb 3.2 oz (111.7 kg)   03/04/21 234 lb 3.2 oz (106.2 kg)     There is no height or weight on file to calculate BMI.    CBC:   Lab Results   Component Value Date    WBC 4.0 04/07/2020    RBC 4.65 04/07/2020    HGB 14.7 04/07/2020    HCT 44.5 04/07/2020    MCV 95.7 04/07/2020    RDW 13.6 04/07/2020     04/07/2020       CMP:   Lab Results   Component Value Date     07/14/2020    K 3.9 07/14/2020     07/14/2020    CO2 25 07/14/2020    BUN 20 07/14/2020    CREATININE 1.10 07/14/2020    GFRAA 89 07/14/2020    AGRATIO 2.3 12/03/2018    LABGLOM 77 07/14/2020    GLUCOSE 90 07/14/2020    PROT 6.4 07/14/2020    CALCIUM 9.4 07/14/2020    BILITOT 1.0 07/14/2020    ALKPHOS 62 07/14/2020    AST 16 07/14/2020    ALT 16 07/14/2020       POC Tests: No results for input(s): POCGLU, POCNA, POCK, POCCL, POCBUN, POCHEMO, POCHCT in the last 72 hours.     Coags: No results found for: PROTIME, INR, APTT    HCG (If

## 2021-07-12 NOTE — PROGRESS NOTES
Patient transferred from cath lab to pacu, arouses to voice, VSS, right groin soft, dressing CDI, RLE neurovascular checks WDL, NSR, will monitor.

## 2021-07-16 ENCOUNTER — TELEPHONE (OUTPATIENT)
Dept: CARDIOLOGY | Age: 51
End: 2021-07-16

## 2021-07-16 NOTE — TELEPHONE ENCOUNTER
Patient calling in to obtain medication from his \"bone bruise\" on his butt. Uncertain as to what he actually wanted but he states he bruised it when he was squeezing through a tight area in his garage. No cardiac issues at this time.  Advised him to contact his PCP

## 2021-07-20 ENCOUNTER — TELEPHONE (OUTPATIENT)
Dept: CARDIOLOGY CLINIC | Age: 51
End: 2021-07-20

## 2021-07-20 NOTE — TELEPHONE ENCOUNTER
He had an ablation 1 week ago with MXA. Today he was feeling \"different\" so he took his b/p and it was low 101/66. His b/p has never been that low .  Patient wonders if his meds need to be adjusted

## 2021-07-20 NOTE — TELEPHONE ENCOUNTER
Spoke with the patient and advised that his BPs are acceptable per [facility] . Pt denies any SOB , chest pain, or lightheadedness at this time . Patient states that he feels \"so much better\" after procedure but just had some tingling down the incision on his leg. He states that his feels funny in chest but denies pain.

## 2021-07-20 NOTE — TELEPHONE ENCOUNTER
His blood pressure on 7/12 was similar 106/72, dizzy, lightheaded?  Any symptoms, this blood pressure is completely acceptable

## 2021-08-05 RX ORDER — PANTOPRAZOLE SODIUM 40 MG/1
TABLET, DELAYED RELEASE ORAL
Qty: 30 TABLET | Refills: 0 | OUTPATIENT
Start: 2021-08-05

## 2021-08-06 ENCOUNTER — TELEPHONE (OUTPATIENT)
Dept: CARDIOLOGY CLINIC | Age: 51
End: 2021-08-06

## 2021-08-06 NOTE — TELEPHONE ENCOUNTER
Pt states he only has 5 pills left on the xarelto. States he has [employer] but is asking for samples to hold him over until paper work gets completed.   he takes 20 mg daily

## 2021-08-06 NOTE — TELEPHONE ENCOUNTER
Called patient and gave him the [employer] number.  He had tried the 10$ co-pay card in the past. I explained this was different program. He will call back if this doesn't work

## 2021-08-09 NOTE — TELEPHONE ENCOUNTER
Last OV: 4/22/21  Last labs: n/a  Appt scheduled : 10/11/21    3 bottles of 20 mg Xarelto provided   Lot [id number]  Exp: 09/23    I called p[t to let him know we have samples available for  at his earliest convenience. He verbalized understanding.

## 2021-08-26 ENCOUNTER — TELEPHONE (OUTPATIENT)
Dept: CARDIOLOGY CLINIC | Age: 51
End: 2021-08-26

## 2021-08-26 NOTE — TELEPHONE ENCOUNTER
Paper work is with Hellen Leventhal. He needs to sign the form so she can  Complete it and send it in. Patient was informed of this and states he will be in 08/27/2021 early to sign paper work.

## 2021-08-26 NOTE — TELEPHONE ENCOUNTER
Pt calling he was supposed to come in yesterday to sign paperwork to get help with his Xarelto but couldn't make it in. Wants to know if the paperwork is ready and he wants to come in today to sign it and get it sent in. Bailee said she'll have to find out who has the paperwork.  Pls call to advise Thank you

## 2021-09-11 PROBLEM — Z77.128: Status: RESOLVED | Noted: 2018-02-28 | Resolved: 2021-09-11

## 2021-09-11 NOTE — PROGRESS NOTES
[address]   Electrophysiology  KARTHIK Velez-CNP  Attending EP: Dr. Eden Carrillo    Date: 10/11/2021  I had the privilege of visiting Nory Armenta in the office. Chief Complaint:   Chief Complaint   Patient presents with    Follow-Up from Hospital     post ablation    Atrial Fibrillation     History of Present Illness: History obtained from patient and medical record. Nory Armenta is 49 y.o. male with a past medical history of ADD, bipolar disorder, graves disease, and atrial fibrillation. S/p cryo-ablation with PVI (7/12/21)    -Interval history: Today, Nory Armenta is being seen for follow up. He is doing well since his ablation. Pt denies any atrial fibrillation since the procedure. He states every so often he feels a skipped beat or double beat in his chest, but it comes and goes quickly. He denies any bleeding issues on Xarelto, but states the price is too high for him. We discussed that he may stop his Xarelto post ablation. 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 He states his BP is well controlled when he checks it at home. Pt denies any dizziness/lightheadedness issues. He has never been checked for sleep apnea. Denies having chest pain, palpitations, shortness of breath, orthopnea/PND, cough, or dizziness at the time of this visit. With regard to medication therapy the patient has been compliant with prescribed regimen. They have tolerated therapy to date. Allergies:  No Known Allergies    Home Medications:  Prior to Visit Medications    Medication Sig Taking?  Authorizing Provider   rivaroxaban (XARELTO) 20 MG TABS tablet Take 1 tablet by mouth daily (with breakfast) Yes Deanna Steward MD   metoprolol succinate (TOPROL XL) 25 MG extended release tablet TAKE 1 TABLET BY MOUTH EVERY DAY Yes Ruba Forde MD   LORazepam (ATIVAN) 1 MG tablet TAKE 1 TABLET BY MOUTH EVERY DAY AS NEEDED Yes Historical Provider, MD   verapamil (CALAN SR) 240 MG extended release tablet Take 1 tablet by mouth daily Yes Robert Marrero MD   methIMAzole (TAPAZOLE) 5 MG tablet TAKE 1 TABLET BY MOUTH DAILY Yes Ranjan Worthy MD   losartan (COZAAR) 50 MG tablet 25 mg  Yes Historical Provider, MD   lithium (ESKALITH) 450 MG extended release tablet Take 450 mg by mouth daily  Yes Historical Provider, MD   benztropine (COGENTIN) 2 MG tablet 1 mg 2 times daily  Yes Historical Provider, MD   ziprasidone (GEODON) 80 MG capsule Take 80 mg by mouth daily  Yes Historical Provider, MD   pantoprazole (PROTONIX) 40 MG tablet Take 1 tablet by mouth every morning (before breakfast)  Patient not taking: Reported on 10/11/2021  Robert Marrero MD      Past Medical History:  Past Medical History:   Diagnosis Date    A-fib [facility])     ADD (attention deficit disorder)     Back pain     [facility] [address]     [facility] disorder [facility] [address]     Goiter     Graves disease     Headache(784.0)     Hyperthyroidism     Thyroid disease      Past Surgical History:    has no past surgical history on file. Social History:  Reviewed. reports that he has quit smoking. He has a 4.75 pack-year smoking history. He has never used smokeless tobacco. He reports current drug use. Drug: Marijuana. He reports that he does not drink alcohol. Family History:  Reviewed. family history includes Arthritis in his mother; Cancer in his father, mother, paternal grandfather, and paternal grandmother; High Blood Pressure in his father; High Cholesterol in his father; Stroke in his paternal grandfather; Vision Loss in his father.      Review of Systems:  · Constitutional: Negative for fever, night sweats, chills, weight changes, or weakness  · Skin: Negative for rash, dry skin, pruritus, bruising, bleeding, blood clots, or changes in skin pigment  · HEENT: Negative for vision changes, ringing in the ears, sore throat, dysphagia, or swollen lymph nodes  · Respiratory: Reviewed in HPI  · Cardiovascular: Reviewed in HPI  · Gastrointestinal: Negative for abdominal pain, N/V/D, constipation, or black/tarry stools  · Genito-Urinary: Negative for dysuria, incontinence, urgency, or hematuria  · Musculoskeletal: Negative for joint swelling, muscle pain, or injuries  · Neurological/Psych: Negative for confusion, seizures, dizziness, headaches, balance issues or TIA-like symptoms. No anxiety, depression, or insomnia    Physical Examination:  Vitals:    10/11/21 0938   BP: 110/77   Pulse: 75   SpO2: 97%      Wt Readings from Last 3 Encounters:   10/11/21 242 lb (109.8 kg)   07/12/21 246 lb (111.6 kg)   05/20/21 243 lb 12.8 oz (110.6 kg)     Constitutional: Cooperative and in no apparent distress, and appears well nourished  Skin: Warm and pink; no pallor, cyanosis, bruising, or clubbing  HEENT: Symmetric and normocephalic. PERRL, EOM intact. Conjunctiva pink with clear sclera. Mucus membranes pink and moist. Teeth intact. Thyroid smooth without nodules or goiter  Respiratory: Respirations symmetric and unlabored. Lungs clear to auscultation bilaterally, no wheezing, rhonchi, or crackles  Cardiovascular:  Regular rate and rhythm. S1/S2 present without murmurs, rubs, or gallops. Peripheral pulses 2+, capillary refill < 3 seconds. No elevation of JVP. No peripheral edema  Gastrointestinal: Abdomen soft and round. Bowel sounds normoactive in all quadrants without tenderness or masses. Musculoskeletal: Bilateral upper and lower extremity strength 5/5 with full ROM. Neurological/Psych: Awake and orientated to person, place and time. Calm affect, appropriate mood.      Pertinent labs, diagnostic, device, and imaging results reviewed as a part of this visit    LABS    CBC:   Lab Results   Component Value Date    WBC 5.2 07/12/2021    HGB 15.2 07/12/2021    HCT 46.3 07/12/2021    MCV 95.6 07/12/2021     07/12/2021     BMP:   Lab Results   Component Value Date    CREATININE 1.2 07/12/2021    BUN 22 (H) 07/12/2021     07/12/2021 K 4.5 07/12/2021     07/12/2021    CO2 24 07/12/2021     Estimated Creatinine Clearance: 97 mL/min (based on SCr of 1.2 mg/dL). Thyroid:   Lab Results   Component Value Date    TSH 4.55 (H) 02/25/2021    P1NWYBW 1.8 01/30/2018     Lipid Panel: No results found for: CHOL, HDL, TRIG  LFTs:  Lab Results   Component Value Date    ALT 12 07/12/2021    AST 15 07/12/2021    ALKPHOS 74 07/12/2021    BILITOT 0.4 07/12/2021     Coags: No results found for: PROTIME, INR, APTT    ECG: 10/11/2021  - NSR, rate 72, QTc 401    Echo: 6/20 ([employer])  The left ventricular wall motion is normal.   The mitral inflow pattern is consistent with Diastolic Dysfunction. There is mild concentric left ventricular hypertrophy. The left atrium is mildly dilated. Left ventricular systolic function is normal. (LVEF>/=55%)   Overall left ventricular ejection fraction is estimated to be 55-60%. A contrast agent was used to demonstrate all left ventricular wall segments. WILLIE: 7/21  Overall left ventricular function is normal. Mitral valve is structurally normal.  Trivial mitral regurgitation is present. There is no evidence of mass or thrombus in the left atrium or appendage. GXT: 6/20 ([employer])  o Negative ECG for ischemia with graded exercise test.   o Duke Treadmill Score is 7 which indicates low risk. MCOT:  3/4/2021  4% Afib burden, Symptoms with Sinus and AFib    Assessment:    1. Paroxysmal Atrial Fibrillation  - S/p cryo-ablation with PVI (7/12/21)  - Currently in NSR  - Continue Toprol XL 25 mg QD, verapamil 240 mg QD  - [id number] score:1 (HTN); [id number] Vasc score and anticoagulation discussed.   ~ Ok to stop Xarelto following ablation blanking period per Dr. Dony Wise procedure note. Will start  mg daily, pt VU    - Afib risk factors including age, HTN, obesity, inactivity and MINISTERIO were discussed with patient.  Risk factor modification recommended   ~ TSH 4.55 (2/21)       - Treatment options including cardioversion, rate control strategy, antiarrhythmics, anticoagulation and possible ablation were discussed with patient. Risks, benefits and alternative of each treatment options were explained. All questions answered    2. HTN  - Controlled: Goal <130/80  - Continue current medications  - Encouraged to monitor and log BP readings at home, then bring log to next visit  - Discussed importance of low sodium diet, weight control and exercise    3. Obesity  - Body mass index is 31.07 kg/m². - Complicating assessment and treatment. Placing patient at risk for multiple co-morbidities as well as early death and contributing to the patient's presentation  - Discussed weight loss and patient was encouraged to reduce calorie intake and increase exercise    4. At Risk For Sleep Apnea  - Multiple risk factors for sleep apnea  - Discussed long term effects of untreated MINISTERIO  - Pt will consider in future, but declined for now given monetary constraints    5. Graves Disease   - Stable   - On tapazole   - Followed by endocrinology    Plan:  1. Ok stop Xarelto  2. Take aspirin 162 mg daily  3. Call if any issues, particularly prolonged atrial fibrillation episodes  4. Let us know if you want to do sleep study  5. Work on exercise and weight loss    F/U: Follow-up with EP in 6 months  -Call Colleen Bro at [phone] with any questions    Diet & Exercise:   The patient is counseled to follow a low salt diet to assure blood pressure remains controlled for cardiovascular risk factor modification   The patient is counseled to avoid excess caffeine, and energy drinks as this may exacerbated ectopy and arrhythmia   The patient is counseled to lose weight to control cardiovascular risk factors   Exercise program discussed: To improve overall cardiovascular health, the patient is instructed to increase cardiovascular related activities with a goal of 150 min/week of moderate level activity or 10,000 steps per day. Encouraged to perform as much activity as tolerated    I have addressed the patient's cardiac risk factors and adjusted pharmacologic treatment as needed. In addition, I have reinforced the need for patient directed risk factor modification. I independently reviewed the ECG    All questions and concerns were addressed with the patient. Alternatives to treatment were discussed. Thank you for allowing to us to participate in the care of Cherylene     Time  30 minutes spent preparing to see patient including reviewing patient history/prior tests/prior consults, performing a medical exam, counseling and educating patient/family/caregiver, ordering medications/tests/procedures, referring and communicating with PCPs and other pertinent consultants, documenting information in the EMR, independently interpreting results and communicating to family and coordination of patient care.     KARTHIK Salvador-CNP  [facility]   Office: [phone]

## 2021-09-17 ENCOUNTER — TELEPHONE (OUTPATIENT)
Dept: CARDIOLOGY CLINIC | Age: 51
End: 2021-09-17

## 2021-09-17 NOTE — TELEPHONE ENCOUNTER
Pt states he only has one dose of his xarelto  left and is asking if paper work for pt assistance is complete. Please call to advise and is there any samples he can get to hold him over.  Please call to advise

## 2021-10-11 ENCOUNTER — OFFICE VISIT (OUTPATIENT)
Dept: CARDIOLOGY CLINIC | Age: 51
End: 2021-10-11
Payer: COMMERCIAL

## 2021-10-11 VITALS
HEART RATE: 75 BPM | OXYGEN SATURATION: 97 % | WEIGHT: 242 LBS | SYSTOLIC BLOOD PRESSURE: 110 MMHG | DIASTOLIC BLOOD PRESSURE: 77 MMHG | BODY MASS INDEX: 31.06 KG/M2 | HEIGHT: 74 IN

## 2021-10-11 DIAGNOSIS — I48.0 PAF (PAROXYSMAL ATRIAL FIBRILLATION) (HCC): Primary | ICD-10-CM

## 2021-10-11 DIAGNOSIS — R03.0 BORDERLINE SYSTOLIC HTN: ICD-10-CM

## 2021-10-11 DIAGNOSIS — E05.90 HYPERTHYROIDISM: ICD-10-CM

## 2021-10-11 PROCEDURE — 99214 OFFICE O/P EST MOD 30 MIN: CPT | Performed by: NURSE PRACTITIONER

## 2021-10-11 PROCEDURE — 93000 ELECTROCARDIOGRAM COMPLETE: CPT | Performed by: NURSE PRACTITIONER

## 2021-10-11 RX ORDER — ASPIRIN 81 MG/1
162 TABLET ORAL DAILY
Qty: 90 TABLET | Refills: 1
Start: 2021-10-11 | End: 2022-06-02 | Stop reason: ALTCHOICE

## 2021-10-11 NOTE — LETTER
[address] Cardiology[address] [facility]  Dept: [phone]  Dept Fax: [phone]      2021    Patient: Leah Driver  : 1970  DOS: 10/11/2021    To Whom it May Concern: It is my medical opinion that Leah Driver shall be excused from work in the morning of 2021 to attend his cardiology appointment. If you have any questions or concerns, please do not hesitate to call.     Sincerely,    KARTHIK Lancaster - CNP

## 2021-11-18 ENCOUNTER — OFFICE VISIT (OUTPATIENT)
Dept: ENDOCRINOLOGY | Age: 51
End: 2021-11-18
Payer: COMMERCIAL

## 2021-11-18 VITALS
OXYGEN SATURATION: 99 % | WEIGHT: 235.6 LBS | BODY MASS INDEX: 30.24 KG/M2 | HEART RATE: 72 BPM | HEIGHT: 74 IN | SYSTOLIC BLOOD PRESSURE: 120 MMHG | DIASTOLIC BLOOD PRESSURE: 74 MMHG

## 2021-11-18 DIAGNOSIS — E04.9 GOITER: ICD-10-CM

## 2021-11-18 DIAGNOSIS — E05.01 GRAVES' DISEASE WITH THYROTOXIC STORM: Primary | ICD-10-CM

## 2021-11-18 DIAGNOSIS — E05.01 GRAVES' DISEASE WITH THYROTOXIC STORM: ICD-10-CM

## 2021-11-18 LAB
A/G RATIO: 2.7 (ref 1.1–2.2)
ALBUMIN SERPL-MCNC: 4.6 G/DL (ref 3.4–5)
ALP BLD-CCNC: 66 U/L (ref 40–129)
ALT SERPL-CCNC: 7 U/L (ref 10–40)
ANION GAP SERPL CALCULATED.3IONS-SCNC: 10 MMOL/L (ref 3–16)
AST SERPL-CCNC: 17 U/L (ref 15–37)
BASOPHILS ABSOLUTE: 0 K/UL (ref 0–0.2)
BASOPHILS RELATIVE PERCENT: 0.3 %
BILIRUB SERPL-MCNC: 0.6 MG/DL (ref 0–1)
BUN BLDV-MCNC: 20 MG/DL (ref 7–20)
CALCIUM SERPL-MCNC: 9.4 MG/DL (ref 8.3–10.6)
CHLORIDE BLD-SCNC: 107 MMOL/L (ref 99–110)
CO2: 24 MMOL/L (ref 21–32)
CREAT SERPL-MCNC: 1 MG/DL (ref 0.9–1.3)
EOSINOPHILS ABSOLUTE: 0.2 K/UL (ref 0–0.6)
EOSINOPHILS RELATIVE PERCENT: 3 %
GFR AFRICAN AMERICAN: >60
GFR NON-AFRICAN AMERICAN: >60
GLUCOSE BLD-MCNC: 75 MG/DL (ref 70–99)
HCT VFR BLD CALC: 40.5 % (ref 40.5–52.5)
HEMOGLOBIN: 13.4 G/DL (ref 13.5–17.5)
LYMPHOCYTES ABSOLUTE: 1.5 K/UL (ref 1–5.1)
LYMPHOCYTES RELATIVE PERCENT: 25.2 %
MCH RBC QN AUTO: 31.9 PG (ref 26–34)
MCHC RBC AUTO-ENTMCNC: 33 G/DL (ref 31–36)
MCV RBC AUTO: 96.5 FL (ref 80–100)
MONOCYTES ABSOLUTE: 0.6 K/UL (ref 0–1.3)
MONOCYTES RELATIVE PERCENT: 9.8 %
NEUTROPHILS ABSOLUTE: 3.7 K/UL (ref 1.7–7.7)
NEUTROPHILS RELATIVE PERCENT: 61.7 %
PDW BLD-RTO: 13.2 % (ref 12.4–15.4)
PLATELET # BLD: 185 K/UL (ref 135–450)
PMV BLD AUTO: 8.7 FL (ref 5–10.5)
POTASSIUM SERPL-SCNC: 4.8 MMOL/L (ref 3.5–5.1)
RBC # BLD: 4.19 M/UL (ref 4.2–5.9)
SODIUM BLD-SCNC: 141 MMOL/L (ref 136–145)
T3 FREE: 3.6 PG/ML (ref 2.3–4.2)
T4 FREE: 1.5 NG/DL (ref 0.9–1.8)
TOTAL PROTEIN: 6.3 G/DL (ref 6.4–8.2)
TSH SERPL DL<=0.05 MIU/L-ACNC: 1.28 UIU/ML (ref 0.27–4.2)
WBC # BLD: 6 K/UL (ref 4–11)

## 2021-11-18 PROCEDURE — 99214 OFFICE O/P EST MOD 30 MIN: CPT | Performed by: INTERNAL MEDICINE

## 2021-11-18 RX ORDER — DEXTROAMPHETAMINE SACCHARATE, AMPHETAMINE ASPARTATE, DEXTROAMPHETAMINE SULFATE AND AMPHETAMINE SULFATE 5; 5; 5; 5 MG/1; MG/1; MG/1; MG/1
20 TABLET ORAL DAILY
COMMUNITY
Start: 2021-11-08

## 2021-11-18 NOTE — PROGRESS NOTES
partner   No morning erections   S/p vasectomy   He reports blood work done with PCP     The following portions of the patient's history were reviewed and updated as appropriate:       Family History   Problem Relation Age of Onset    Arthritis Mother     Cancer Mother         breast    Cancer Father         prostate    High Blood Pressure Father     High Cholesterol Father     Vision Loss Father     Cancer Paternal Grandmother     Cancer Paternal Grandfather     Stroke Paternal Grandfather             Social History     Socioeconomic History    Marital status:      Spouse name: Not on file    Number of children: Not on file    Years of education: Not on file    Highest education level: Not on file   Occupational History    Not on file   Tobacco Use    Smoking status: Former Smoker     Packs/day: 0.25     Years: 19.00     Pack years: 4.75    Smokeless tobacco: Never Used   Vaping Use    Vaping Use: Never used   Substance and Sexual Activity    Alcohol use: No    Drug use: Yes     Types: Marijuana [place])    Sexual activity: Yes     Partners: Female   Other Topics Concern    Not on file   Social History Narrative    Not on file     Social Determinants of Health     Financial Resource Strain:     Difficulty of Paying Living Expenses: Not on file   Food Insecurity:     Worried About [address] in the Last Year: Not on file    [address] in the Last Year: Not on file   Transportation Needs:     Lack of Transportation (Medical): Not on file    Lack of Transportation (Non-Medical):  Not on file   Physical Activity:     Days of Exercise per Week: Not on file    Minutes of Exercise per Session: Not on file   Stress:     Feeling of Stress : Not on file   Social Connections:     Frequency of Communication with Friends and Family: Not on file    Frequency of Social Gatherings with Friends and Family: Not on file    Attends Oriental orthodox Services: Not on file   [employer] or Organizations: Not on file    Attends Club or Organization Meetings: Not on file    Marital Status: Not on file   Intimate Partner Violence:     Fear of Current or Ex-Partner: Not on file    Emotionally Abused: Not on file    Physically Abused: Not on file    Sexually Abused: Not on file   Housing Stability:     Unable to Pay for Housing in the Last Year: Not on file    Number of [place] in the Last Year: Not on file    Unstable Housing in the Last Year: Not on file           Past Medical History:   Diagnosis Date    A-fib [facility])     ADD (attention deficit disorder)     Back pain     [facility] [address]     Bipolar disorder ([facility] [address]     Goiter     Graves disease     Headache(784.0)     Hyperthyroidism     Thyroid disease          History reviewed. No pertinent surgical history. No Known Allergies        Current Outpatient Medications:     amphetamine-dextroamphetamine (ADDERALL) 20 MG tablet, , Disp: , Rfl:     aspirin EC 81 MG EC tablet, Take 2 tablets by mouth daily (Patient taking differently: Take 81 mg by mouth daily ), Disp: 90 tablet, Rfl: 1    metoprolol succinate (TOPROL XL) 25 MG extended release tablet, TAKE 1 TABLET BY MOUTH EVERY DAY, Disp: 30 tablet, Rfl: 5    LORazepam (ATIVAN) 1 MG tablet, TAKE 1 TABLET BY MOUTH EVERY DAY AS NEEDED, Disp: , Rfl:     verapamil (CALAN SR) 240 MG extended release tablet, Take 1 tablet by mouth daily, Disp: 90 tablet, Rfl: 3    methIMAzole (TAPAZOLE) 5 MG tablet, TAKE 1 TABLET BY MOUTH DAILY, Disp: 90 tablet, Rfl: 1    losartan (COZAAR) 50 MG tablet, Take 25 mg by mouth daily , Disp: , Rfl:     lithium (ESKALITH) 450 MG extended release tablet, Take 450 mg by mouth daily , Disp: , Rfl: 2    benztropine (COGENTIN) 2 MG tablet, 1 mg 2 times daily , Disp: , Rfl:     ziprasidone (GEODON) 80 MG capsule, Take 80 mg by mouth daily , Disp: , Rfl:       Review of Systems:  Constitutional: Negative for fever, chills.    HENT: Negative for congestion, ear pain, rhinorrhea,  sore throat and trouble swallowing. Eyes: Negative for photophobia, redness, itching. Respiratory: Negative for cough, shortness of breath and sputum. Cardiovascular: Negative for chest pain and leg swelling. Gastrointestinal: Negative for nausea, vomiting, abdominal pain, diarrhea, constipation. Endocrine: Negative for polydipsia, polyphagia and polyuria. Genitourinary: Negative for dysuria, urgency, frequency, hematuria and flank pain. Musculoskeletal: Negative for myalgias, back pain, arthralgias and neck pain. Skin/Nail: Negative for rash, itching. Normal nails. Neurological: Negative for seizures, light-headedness, numbness and headaches. Hematological/ Lymph nodes: Negative for adenopathy. Does not bruise/bleed easily. Psychiatric/Behavioral: Negative for suicidal ideas and decreased concentration. Physical Exam:  /74 (Site: Left Upper Arm, Position: Sitting, Cuff Size: Large Adult)   Pulse 72   Ht 6' 2\" (1.88 m)   Wt 235 lb 9.6 oz (106.9 kg) Comment: WEARING STEEL TOE BOOTS  SpO2 99%   BMI 30.25 kg/m²     Constitutional: Patient is oriented to person, place, and time. Patient appears well-developed and well-nourished. Pulmonary/Chest: Effort normal   Neurological: Patient is alert and oriented to person, place, and time. Rexlexes normal.   Psychiatric: Patient has a normal mood and affect.  Patient behavior is normal.        Lab Review:    Admission on 07/12/2021, Discharged on 07/12/2021   Component Date Value Ref Range Status    Ventricular Rate 07/12/2021 68  BPM Final    Atrial Rate 07/12/2021 68  BPM Final    P-R Interval 07/12/2021 172  ms Final    QRS Duration 07/12/2021 96  ms Final    Q-T Interval 07/12/2021 414  ms Final    QTc Calculation ([place]ett) 07/12/2021 440  ms Final    P Axis 07/12/2021 5  degrees Final    R Axis 07/12/2021 31  degrees Final    T Axis 07/12/2021 5  degrees Final    Diagnosis 07/12/2021 Normal sinus rhythmNormal ECGNo previous ECGs availableConfirmed by [facility] MD, Earnestine Weiss (1971) on 7/12/2021 6:00:43 PM   Final    WBC 07/12/2021 5.2  4.0 - 11.0 K/uL Final    RBC 07/12/2021 4.85  4.20 - 5.90 M/uL Final    Hemoglobin 07/12/2021 15.2  13.5 - 17.5 g/dL Final    Hematocrit 07/12/2021 46.3  40.5 - 52.5 % Final    MCV 07/12/2021 95.6  80.0 - 100.0 fL Final    MCH 07/12/2021 31.3  26.0 - 34.0 pg Final    MCHC 07/12/2021 32.7  31.0 - 36.0 g/dL Final    RDW 07/12/2021 13.4  12.4 - 15.4 % Final    Platelets 69/52/1443 189  135 - 450 K/uL Final    MPV 07/12/2021 7.5  5.0 - 10.5 fL Final    Neutrophils % 07/12/2021 58.4  % Final    Lymphocytes % 07/12/2021 27.0  % Final    Monocytes % 07/12/2021 8.4  % Final    Eosinophils % 07/12/2021 5.5  % Final    Basophils % 07/12/2021 0.7  % Final    Neutrophils Absolute 07/12/2021 3.0  1.7 - 7.7 K/uL Final    Lymphocytes Absolute 07/12/2021 1.4  1.0 - 5.1 K/uL Final    Monocytes Absolute 07/12/2021 0.4  0.0 - 1.3 K/uL Final    Eosinophils Absolute 07/12/2021 0.3  0.0 - 0.6 K/uL Final    Basophils Absolute 07/12/2021 0.0  0.0 - 0.2 K/uL Final    Sodium 07/12/2021 140  136 - 145 mmol/L Final    Potassium 07/12/2021 4.5  3.5 - 5.1 mmol/L Final    Chloride 07/12/2021 107  99 - 110 mmol/L Final    CO2 07/12/2021 24  21 - 32 mmol/L Final    Anion Gap 07/12/2021 9  3 - 16 Final    Glucose 07/12/2021 107* 70 - 99 mg/dL Final    BUN 07/12/2021 22* 7 - 20 mg/dL Final    CREATININE 07/12/2021 1.2  0.9 - 1.3 mg/dL Final    GFR Non- 07/12/2021 >60  >60 Final    GFR  07/12/2021 >60  >60 Final    Calcium 07/12/2021 9.6  8.3 - 10.6 mg/dL Final    Total Protein 07/12/2021 6.6  6.4 - 8.2 g/dL Final    Albumin 07/12/2021 4.2  3.4 - 5.0 g/dL Final    Albumin/Globulin Ratio 07/12/2021 1.8  1.1 - 2.2 Final    Total Bilirubin 07/12/2021 0.4  0.0 - 1.0 mg/dL Final    Alkaline Phosphatase 07/12/2021 74  40 - 129 U/L Final    ALT 07/12/2021 12  10 - 40 U/L Final    AST 07/12/2021 15  15 - 37 U/L Final    Globulin 07/12/2021 2.4  g/dL Final    Magnesium 07/12/2021 2.00  1.80 - 2.40 mg/dL Final    ABO/Rh 07/12/2021 A POS   Final    Antibody Screen 07/12/2021 NEG   Final    POC ACT LR 07/12/2021 362  Not Establised sec Final    POC ACT LR 07/12/2021 387  Not Establised sec Final    POC ACT LR 07/12/2021 >400  Not Establised sec Final    POC ACT LR 07/12/2021 >400  Not Establised sec Final   Orders Only on 06/24/2021   Component Date Value Ref Range Status    TSH Reflex FT4 06/24/2021 1.38  0.27 - 4.20 uIU/mL Final   Orders Only on 02/25/2021   Component Date Value Ref Range Status    Normetanephrine, Free 02/25/2021 0.48  0.00 - 0.89 nmol/L Final    Metanephrine, Free 02/25/2021 0.17  0.00 - 0.49 nmol/L Final    Metaneph/Plasma Interp 02/25/2021 See Note   Final    T3, Free 02/25/2021 4.0  2.3 - 4.2 pg/mL Final    T4 Free 02/25/2021 1.4  0.9 - 1.8 ng/dL Final    TSH 02/25/2021 4.55* 0.27 - 4.20 uIU/mL Final        No results found. Assessment/Plan:     Josiah Denton was seen today for hyperthyroidism. Diagnoses and all orders for this visit:    Graves' disease with thyrotoxic storm  -     TSH without Reflex; Future  -     T4, Free; Future  -     T3, Free; Future  -     CBC Auto Differential; Future  -     Comprehensive Metabolic Panel; Future    Goiter  -     TSH without Reflex; Future  -     T4, Free; Future  -     T3, Free; Future  -     CBC Auto Differential; Future  -     Comprehensive Metabolic Panel; Future        1: Hyperthyroidism and Goiter     Jan 2018: TRAB 1.33 (<175), , Tg and TPO abs normal     TFTs normal July 2021       C/w Methimazole 5mg one daily    Keep metoprol 25mg daily     Discussed radio iodine vs surgery. First option should work as he has hyperthyroidism.      He has hig copay plan does not want to do uptake or surgery        From Aug 2014 till Jan 2018, imaging showed almost double the volume of thyroid gland. It happened after he was taking lithium. Increase in thyroid size likely Lithium related. I would recommend thyroid surgery. Alternative is radio iodine but it may not shrink completely. Risks of thyroid surgery also discussed. He does not want to have surgery or radio iodine. Wants to keep methimazole for long term. Hyperthyroidism medication education   'Reviewed the risks of anti-thyroid drugs including agranulocytosis and hepatitis. If the patient develops a fever, sore throat, jaundice, malaise and/or anorexia, patient is to stop the medicine and call ASAP.     2: Low libido/ ED  As per PCP     3: Episodes of palpitations   Metanephrines normal - Feb 2021     Blood work today     RTC in 6 months        Electronically signed by Carlitos Yadav MD on 11/18/2021 at 4:54 PM

## 2021-12-12 DIAGNOSIS — E05.90 HYPERTHYROIDISM: ICD-10-CM

## 2021-12-12 DIAGNOSIS — E04.9 GOITER: ICD-10-CM

## 2021-12-13 RX ORDER — METHIMAZOLE 5 MG/1
TABLET ORAL
Qty: 90 TABLET | Refills: 1 | Status: SHIPPED | OUTPATIENT
Start: 2021-12-13 | End: 2022-04-25

## 2021-12-13 NOTE — TELEPHONE ENCOUNTER
Requested Prescriptions     Pending Prescriptions Disp Refills    methIMAzole (TAPAZOLE) 5 MG tablet [Pharmacy Med Name: METHIMAZOLE 5 MG TABLET] 90 tablet 1     Sig: TAKE 1 TABLET BY MOUTH DAILY     Last refilled:3/8/2021  Last seen: 11/18/2021  Follow up: 6/2/2022

## 2022-01-25 ENCOUNTER — TELEPHONE (OUTPATIENT)
Dept: CARDIOLOGY CLINIC | Age: 52
End: 2022-01-25

## 2022-01-25 NOTE — TELEPHONE ENCOUNTER
Please ensure he is taking his medications then checking his blood pressure at least two hours after.  He should be seated a minimum of 10 minutes for an accurate measurement

## 2022-01-25 NOTE — TELEPHONE ENCOUNTER
Called pt about the message below and he verbalized understanding of the message below and he is taking all 3 of them medications.

## 2022-01-25 NOTE — TELEPHONE ENCOUNTER
Last OV 10/11/21  Ablation 7/12/21 MXA  Pt states he is under a lot of stress, at home, and work. BP has been elevated for the past couple days. Today it is 151/93. He resumed taking the Adderall, and vapes THC products.

## 2022-01-25 NOTE — TELEPHONE ENCOUNTER
Called pt about the message below and he verbalized understanding       FYI  ~Patient also stated that he is thinking maybe it could be the cuff because he is feeling fine with no symptoms.

## 2022-02-11 ENCOUNTER — TELEPHONE (OUTPATIENT)
Dept: CARDIOLOGY CLINIC | Age: 52
End: 2022-02-11

## 2022-02-11 NOTE — TELEPHONE ENCOUNTER
Pt states his insurance is requesting pt's medical records from 3/4/21 to present including office notes, ablation and testing. [phone]. They are trying to find out if this was a pre existing condition. Meryle Sandman

## 2022-02-14 ENCOUNTER — TELEPHONE (OUTPATIENT)
Dept: ENDOCRINOLOGY | Age: 52
End: 2022-02-14

## 2022-02-14 NOTE — TELEPHONE ENCOUNTER
Pt blairm stating his insurance sent over a medical records request because he is needing his records from back in 2018 to current.  He is asking if we have rec'd the request?  CB# [phone]

## 2022-04-23 DIAGNOSIS — E04.9 GOITER: ICD-10-CM

## 2022-04-23 DIAGNOSIS — E05.90 HYPERTHYROIDISM: ICD-10-CM

## 2022-04-25 RX ORDER — METHIMAZOLE 5 MG/1
TABLET ORAL
Qty: 90 TABLET | Refills: 0 | Status: SHIPPED | OUTPATIENT
Start: 2022-04-25 | End: 2022-09-19

## 2022-04-25 RX ORDER — VERAPAMIL HYDROCHLORIDE 240 MG/1
TABLET, FILM COATED, EXTENDED RELEASE ORAL
Qty: 90 TABLET | Refills: 3 | Status: SHIPPED | OUTPATIENT
Start: 2022-04-25

## 2022-04-25 NOTE — TELEPHONE ENCOUNTER
Received refill request for Verapamil ER 240mh from [facility] : 10/11/2021 with NPSR    Last EKG : 10/11/2021    Last Refill : 04/22/2021 90 w/3 refills    Next OV : Pt on recall list for 04/09/2022 with NPSR

## 2022-04-25 NOTE — TELEPHONE ENCOUNTER
I successfully faxed 61 pages to the very same fax # on February 11,2022. The confirmation is scanned. I will reprint and refax records then call them to verify receipt.

## 2022-04-25 NOTE — TELEPHONE ENCOUNTER
Pt called back stating that [employer] is stating they have  not received the information requested from 3/4/21 to present including office notes, ablation and testing.  They are trying to find out if it is preexisting     [address] [place] [address]   Fax Number [phone]  [place]: [phone]

## 2022-04-25 NOTE — TELEPHONE ENCOUNTER
I attempted to call the insurance company but the #'s provided are both fax #'s. I called the number on pt's scanned insurance card ([phone]) but was placed on eternal hold. I will try them again in the morning.

## 2022-05-27 DIAGNOSIS — E04.9 GOITER: ICD-10-CM

## 2022-05-27 DIAGNOSIS — E05.90 HYPERTHYROIDISM: ICD-10-CM

## 2022-05-31 RX ORDER — METHIMAZOLE 5 MG/1
TABLET ORAL
Qty: 90 TABLET | Refills: 0 | OUTPATIENT
Start: 2022-05-31

## 2022-06-02 ENCOUNTER — OFFICE VISIT (OUTPATIENT)
Dept: ENDOCRINOLOGY | Age: 52
End: 2022-06-02
Payer: COMMERCIAL

## 2022-06-02 VITALS
HEIGHT: 74 IN | OXYGEN SATURATION: 98 % | HEART RATE: 70 BPM | BODY MASS INDEX: 27.18 KG/M2 | WEIGHT: 211.8 LBS | SYSTOLIC BLOOD PRESSURE: 112 MMHG | DIASTOLIC BLOOD PRESSURE: 77 MMHG

## 2022-06-02 DIAGNOSIS — E04.9 GOITER: ICD-10-CM

## 2022-06-02 DIAGNOSIS — E05.90 HYPERTHYROIDISM: Primary | ICD-10-CM

## 2022-06-02 PROCEDURE — 99214 OFFICE O/P EST MOD 30 MIN: CPT | Performed by: INTERNAL MEDICINE

## 2022-06-02 RX ORDER — OMEPRAZOLE 40 MG/1
CAPSULE, DELAYED RELEASE ORAL
COMMUNITY
Start: 2022-04-08

## 2022-06-02 RX ORDER — TRIHEXYPHENIDYL HYDROCHLORIDE 5 MG/1
TABLET ORAL
COMMUNITY
Start: 2022-04-08

## 2022-06-02 NOTE — PROGRESS NOTES
Patient ID: Henrry Garcia is a 47 y.o. male    Chief Complaint: Graves disease, goiter     HPI:   Henrry Garcia is here for an evaluation of Graves disease, goiter     US Aug 2014: The right lobe of the thyroid gland measures 5.2 x 2.6 x 2.6 cm in size. The left lobe of the thyroid gland measures 5.1 x 1.9 x 1.5 cm in size. There is a hypoechoic nodule identified at the lower pole of the right lobe of thyroid gland measuring 1.2 x 1.3 x 1.6 cm in size. There is a small 9 mm x 6 mm hypoechoic nodule identified at the lower pole of the left lobe of the thyroid gland. CT neck done Jan 2018 for goiter: Thyroid gland measures 10.5 cm in maximal transverse dimension. The maximal thickness of the isthmus is 2.3 cm. The right lobe of the thyroid gland measures 9.7 x 6.2 x 5.9 cm in size. Left lobe of the thyroid gland measures 9.2 x 5.5 x 4.3 cm in size. No significant mass effect and narrowing of the airway identified. US March 2018: Right thyroid lobe: 6.6 x 5.3 x 8.2 cm   Left thyroid lobe:  4.9 x 5.1 x 7.8 cm   Isthmus: 1.6 cm  No nodules. Lithium for a long time became adherent in 2015     Sep 2016, thyroid abs normal   , N <55    Cardiac ablation July 2021 by Dr. Roro Kirby     Family history of thyroid disorder: None   No Neck radiation    Interval history:   Methimazole 5mg daily   On metoprolol 25 mg daily     Off lithium     Energy levels are okay. On adderal. Vyvanse was expensive. Episodes of palpitations, dizziness, normal blood pressure - 0  per week   No hair or skin changes   Weight loss of 24 lbs since last visit. He is not snacking at night. Sometimes hot, sometimes cold. Keeps fan on every night. Has depression and anxiety   Denies excessive sweating, tremors, sleep issues   No Neck pain. No more changes in voice after prolonged talking. Mild stable hoarseness  No compressive symptoms     No recent Iodine contrast exposure    Ibuprofen prn.  No other food supplements, herbs or medications including Biotin  No recent URI     Low libido   Does not have a partner   No morning erections   S/p vasectomy   He reports blood work done with PCP     The following portions of the patient's history were reviewed and updated as appropriate:       Family History   Problem Relation Age of Onset    Arthritis Mother     Cancer Mother         breast    Cancer Father         prostate    High Blood Pressure Father     High Cholesterol Father     Vision Loss Father     Cancer Paternal Grandmother     Cancer Paternal Grandfather     Stroke Paternal Grandfather             Social History     Socioeconomic History    Marital status:      Spouse name: Not on file    Number of children: Not on file    Years of education: Not on file    Highest education level: Not on file   Occupational History    Not on file   Tobacco Use    Smoking status: Former Smoker     Packs/day: 0.25     Years: 19.00     Pack years: 4.75     Quit date: 1995     Years since quittin.4    Smokeless tobacco: Never Used   Vaping Use    Vaping Use: Never used   Substance and Sexual Activity    Alcohol use: No    Drug use: Yes     Types: Marijuana Chrystal Aver)    Sexual activity: Yes     Partners: Female   Other Topics Concern    Not on file   Social History Narrative    Not on file     Social Determinants of Health     Financial Resource Strain:     Difficulty of Paying Living Expenses: Not on file   Food Insecurity:     Worried About [address] in the Last Year: Not on file    [address] in the Last Year: Not on file   Transportation Needs:     Lack of Transportation (Medical): Not on file    Lack of Transportation (Non-Medical):  Not on file   Physical Activity:     Days of Exercise per Week: Not on file    Minutes of Exercise per Session: Not on file   Stress:     Feeling of Stress : Not on file   Social Connections:     Frequency of Communication with Friends and Family: Not on file  Frequency of Social Gatherings with Friends and Family: Not on file    Attends [employer] Services: Not on file    Active Member of Clubs or Organizations: Not on file    Attends Club or Organization Meetings: Not on file    Marital Status: Not on file   Intimate Partner Violence:     Fear of Current or Ex-Partner: Not on file    Emotionally Abused: Not on file    Physically Abused: Not on file    Sexually Abused: Not on file   Housing Stability:     Unable to Pay for Housing in the Last Year: Not on file    Number of [place] in the Last Year: Not on file    Unstable Housing in the Last Year: Not on file           Past Medical History:   Diagnosis Date    [facility]-fib [facility])     ADD (attention deficit disorder)     Back pain     Bipolar disorder ([facility] [address]     Bipolar disorder ([facility]     Goiter     Graves disease     Headache(784.0)     Hyperthyroidism     Thyroid disease          History reviewed. No pertinent surgical history. No Known Allergies        Current Outpatient Medications:     trihexyphenidyl (ARTANE) 5 MG tablet, TAKE 1 TABLET BY MOUTH TWICE A DAY, Disp: , Rfl:     omeprazole (PRILOSEC) 40 MG delayed release capsule, TAKE 1 CAPSULE AT NIGHT FOR 90 DAY(S), Disp: , Rfl:     methIMAzole (TAPAZOLE) 5 MG tablet, TAKE 1 TABLET BY MOUTH EVERY DAY, Disp: 90 tablet, Rfl: 0    verapamil (CALAN SR) 240 MG extended release tablet, TAKE 1 TABLET BY MOUTH EVERY DAY, Disp: 90 tablet, Rfl: 3    metoprolol succinate (TOPROL XL) 25 MG extended release tablet, TAKE 1 TABLET BY MOUTH EVERY DAY, Disp: 30 tablet, Rfl: 11    amphetamine-dextroamphetamine (ADDERALL) 20 MG tablet, Take 20 mg by mouth daily.  , Disp: , Rfl:     LORazepam (ATIVAN) 1 MG tablet, TAKE 1 TABLET BY MOUTH EVERY DAY AS NEEDED, Disp: , Rfl:     losartan (COZAAR) 50 MG tablet, Take 25 mg by mouth daily , Disp: , Rfl:     ziprasidone (GEODON) 80 MG capsule, Take 80 mg by mouth daily , Disp: , Rfl:       Review of Systems:  Constitutional: Negative for fever, chills. HENT: Negative for congestion, ear pain, rhinorrhea,  sore throat and trouble swallowing. Eyes: Negative for photophobia, redness, itching. Respiratory: Negative for cough, shortness of breath and sputum. Cardiovascular: Negative for chest pain and leg swelling. Gastrointestinal: Negative for nausea, vomiting, abdominal pain, diarrhea, constipation. Endocrine: Negative for polydipsia, polyphagia and polyuria. Genitourinary: Negative for dysuria, urgency, frequency, hematuria and flank pain. Musculoskeletal: Negative for myalgias, back pain, arthralgias and neck pain. Skin/Nail: Negative for rash, itching. Normal nails. Neurological: Negative for seizures, light-headedness, numbness and headaches. Hematological/ Lymph nodes: Negative for adenopathy. Does not bruise/bleed easily. Psychiatric/Behavioral: Negative for suicidal ideas and decreased concentration. Physical Exam:  /77 (Site: Right Upper Arm, Position: Sitting, Cuff Size: Large Adult)   Pulse 70   Ht 6' 2\" (1.88 m)   Wt 211 lb 12.8 oz (96.1 kg)   SpO2 98%   BMI 27.19 kg/m²     Constitutional: Patient is oriented to person, place, and time. Patient appears well-developed and well-nourished. Cardiovascular: Normal rate, regular rhythm and normal heart sounds. Pulmonary/Chest: Effort normal and breath sounds normal.   Neurological: Patient is alert and oriented to person, place, and time. Rexlexes normal. No hand tremors   Psychiatric: Patient has a normal mood and affect.  Patient behavior is normal.        Lab Review:    Orders Only on 11/18/2021   Component Date Value Ref Range Status    Sodium 11/18/2021 141  136 - 145 mmol/L Final    Potassium 11/18/2021 4.8  3.5 - 5.1 mmol/L Final    Chloride 11/18/2021 107  99 - 110 mmol/L Final    CO2 11/18/2021 24  21 - 32 mmol/L Final    Anion Gap 11/18/2021 10  3 - 16 Final    Glucose 11/18/2021 75  70 - 99 mg/dL Final    BUN 11/18/2021 20  7 - 20 mg/dL Final    CREATININE 11/18/2021 1.0  0.9 - 1.3 mg/dL Final    GFR Non- 11/18/2021 >60  >60 Final    GFR  11/18/2021 >60  >60 Final    Calcium 11/18/2021 9.4  8.3 - 10.6 mg/dL Final    Total Protein 11/18/2021 6.3* 6.4 - 8.2 g/dL Final    Albumin 11/18/2021 4.6  3.4 - 5.0 g/dL Final    Albumin/Globulin Ratio 11/18/2021 2.7* 1.1 - 2.2 Final    Total Bilirubin 11/18/2021 0.6  0.0 - 1.0 mg/dL Final    Alkaline Phosphatase 11/18/2021 66  40 - 129 U/L Final    ALT 11/18/2021 7* 10 - 40 U/L Final    AST 11/18/2021 17  15 - 37 U/L Final    WBC 11/18/2021 6.0  4.0 - 11.0 K/uL Final    RBC 11/18/2021 4.19* 4.20 - 5.90 M/uL Final    Hemoglobin 11/18/2021 13.4* 13.5 - 17.5 g/dL Final    Hematocrit 11/18/2021 40.5  40.5 - 52.5 % Final    MCV 11/18/2021 96.5  80.0 - 100.0 fL Final    MCH 11/18/2021 31.9  26.0 - 34.0 pg Final    MCHC 11/18/2021 33.0  31.0 - 36.0 g/dL Final    RDW 11/18/2021 13.2  12.4 - 15.4 % Final    Platelets 10/02/0824 185  135 - 450 K/uL Final    MPV 11/18/2021 8.7  5.0 - 10.5 fL Final    Neutrophils % 11/18/2021 61.7  % Final    Lymphocytes % 11/18/2021 25.2  % Final    Monocytes % 11/18/2021 9.8  % Final    Eosinophils % 11/18/2021 3.0  % Final    Basophils % 11/18/2021 0.3  % Final    Neutrophils Absolute 11/18/2021 3.7  1.7 - 7.7 K/uL Final    Lymphocytes Absolute 11/18/2021 1.5  1.0 - 5.1 K/uL Final    Monocytes Absolute 11/18/2021 0.6  0.0 - 1.3 K/uL Final    Eosinophils Absolute 11/18/2021 0.2  0.0 - 0.6 K/uL Final    Basophils Absolute 11/18/2021 0.0  0.0 - 0.2 K/uL Final    T3, Free 11/18/2021 3.6  2.3 - 4.2 pg/mL Final    T4 Free 11/18/2021 1.5  0.9 - 1.8 ng/dL Final    TSH 11/18/2021 1.28  0.27 - 4.20 uIU/mL Final   Admission on 07/12/2021, Discharged on 07/12/2021   Component Date Value Ref Range Status    Ventricular Rate 07/12/2021 68  BPM Final    Atrial Rate 07/12/2021 68  BPM Final    P-R Interval 07/12/2021 172  ms Final    QRS Duration 07/12/2021 96  ms Final    Q-T Interval 07/12/2021 414  ms Final    QTc Calculation (Bazett) 07/12/2021 440  ms Final    P Axis 07/12/2021 5  degrees Final    R Axis 07/12/2021 31  degrees Final    T Axis 07/12/2021 5  degrees Final    Diagnosis 07/12/2021 Normal sinus rhythmNormal ECGNo previous ECGs availableConfirmed by [facility], Jose Alfredo Sylvester (1971) on 7/12/2021 6:00:43 PM   Final    WBC 07/12/2021 5.2  4.0 - 11.0 K/uL Final    RBC 07/12/2021 4.85  4.20 - 5.90 M/uL Final    Hemoglobin 07/12/2021 15.2  13.5 - 17.5 g/dL Final    Hematocrit 07/12/2021 46.3  40.5 - 52.5 % Final    MCV 07/12/2021 95.6  80.0 - 100.0 fL Final    MCH 07/12/2021 31.3  26.0 - 34.0 pg Final    MCHC 07/12/2021 32.7  31.0 - 36.0 g/dL Final    RDW 07/12/2021 13.4  12.4 - 15.4 % Final    Platelets 55/39/6988 189  135 - 450 K/uL Final    MPV 07/12/2021 7.5  5.0 - 10.5 fL Final    Neutrophils % 07/12/2021 58.4  % Final    Lymphocytes % 07/12/2021 27.0  % Final    Monocytes % 07/12/2021 8.4  % Final    Eosinophils % 07/12/2021 5.5  % Final    Basophils % 07/12/2021 0.7  % Final    Neutrophils Absolute 07/12/2021 3.0  1.7 - 7.7 K/uL Final    Lymphocytes Absolute 07/12/2021 1.4  1.0 - 5.1 K/uL Final    Monocytes Absolute 07/12/2021 0.4  0.0 - 1.3 K/uL Final    Eosinophils Absolute 07/12/2021 0.3  0.0 - 0.6 K/uL Final    Basophils Absolute 07/12/2021 0.0  0.0 - 0.2 K/uL Final    Sodium 07/12/2021 140  136 - 145 mmol/L Final    Potassium 07/12/2021 4.5  3.5 - 5.1 mmol/L Final    Chloride 07/12/2021 107  99 - 110 mmol/L Final    CO2 07/12/2021 24  21 - 32 mmol/L Final    Anion Gap 07/12/2021 9  3 - 16 Final    Glucose 07/12/2021 107* 70 - 99 mg/dL Final    BUN 07/12/2021 22* 7 - 20 mg/dL Final    CREATININE 07/12/2021 1.2  0.9 - 1.3 mg/dL Final    GFR Non- 07/12/2021 >60  >60 Final    GFR  07/12/2021 >60  >60 Final    Calcium 07/12/2021 9.6  8.3 - 10.6 mg/dL Final    Total Protein 07/12/2021 6.6  6.4 - 8.2 g/dL Final    Albumin 07/12/2021 4.2  3.4 - 5.0 g/dL Final    Albumin/Globulin Ratio 07/12/2021 1.8  1.1 - 2.2 Final    Total Bilirubin 07/12/2021 0.4  0.0 - 1.0 mg/dL Final    Alkaline Phosphatase 07/12/2021 74  40 - 129 U/L Final    ALT 07/12/2021 12  10 - 40 U/L Final    AST 07/12/2021 15  15 - 37 U/L Final    Globulin 07/12/2021 2.4  g/dL Final    Magnesium 07/12/2021 2.00  1.80 - 2.40 mg/dL Final    ABO/Rh 07/12/2021 A POS   Final    Antibody Screen 07/12/2021 NEG   Final    POC ACT LR 07/12/2021 362  Not Establised sec Final    POC ACT LR 07/12/2021 387  Not Establised sec Final    POC ACT LR 07/12/2021 >400  Not Establised sec Final    POC ACT LR 07/12/2021 >400  Not Establised sec Final   Orders Only on 06/24/2021   Component Date Value Ref Range Status    TSH Reflex FT4 06/24/2021 1.38  0.27 - 4.20 uIU/mL Final        No results found. Assessment/Plan:     Ewa Lay was seen today for hyperthyroidism. Diagnoses and all orders for this visit:    Hyperthyroidism  -     TSH; Future  -     T4, Free; Future  -     T3, Free; Future    Goiter        1: Hyperthyroidism and Goiter     Jan 2018: TRAB 1.33 (<175), , Tg and TPO abs normal     TFTs normal Nov 2021        C/w Methimazole 5mg one daily    Keep metoprol 25mg daily     Discussed radio iodine vs surgery. First option should work as he has hyperthyroidism. He has hig copay plan does not want to do uptake or surgery        From Aug 2014 till Jan 2018, imaging showed almost double the volume of thyroid gland. It happened after he was taking lithium. Increase in thyroid size likely Lithium related. I would recommend thyroid surgery. Alternative is radio iodine but it may not shrink completely. Risks of thyroid surgery also discussed. He does not want to have surgery or radio iodine. Wants to keep methimazole for long term.      Hyperthyroidism medication education   'Reviewed the risks of anti-thyroid drugs including agranulocytosis and hepatitis. If the patient develops a fever, sore throat, jaundice, malaise and/or anorexia, patient is to stop the medicine and call ASAP.     2: Low libido/ ED  As per PCP     3: Episodes of palpitations   Metanephrines normal - Feb 2021     Blood work tomorrow or next week   Skip cbc and CMP, will do next time - later this year    RTC in 6 months        Electronically signed by Luis Leach MD on 6/2/2022 at 4:54 PM

## 2022-06-03 LAB
T3 FREE: 3.47 PG/ML (ref 2–4.4)
T4 FREE: 1.41 NG/DL (ref 0.8–1.8)
TSH ULTRASENSITIVE: 1.12 MCIU/ML (ref 0.27–4.2)

## 2022-06-07 ENCOUNTER — TELEPHONE (OUTPATIENT)
Dept: ENDOCRINOLOGY | Age: 52
End: 2022-06-07

## 2022-06-07 NOTE — TELEPHONE ENCOUNTER
----- Message from Tasha Biggs MD sent at 6/6/2022  5:17 PM EDT -----  Please inform thyroid blood work is good   No changes

## 2022-08-02 ENCOUNTER — TELEPHONE (OUTPATIENT)
Dept: PRIMARY CARE CLINIC | Age: 52
End: 2022-08-02

## 2022-08-02 NOTE — TELEPHONE ENCOUNTER
Medical records request sent to [facility]

## 2022-09-19 DIAGNOSIS — E04.9 GOITER: ICD-10-CM

## 2022-09-19 DIAGNOSIS — E05.90 HYPERTHYROIDISM: ICD-10-CM

## 2022-09-19 RX ORDER — METHIMAZOLE 5 MG/1
TABLET ORAL
Qty: 90 TABLET | Refills: 0 | Status: SHIPPED | OUTPATIENT
Start: 2022-09-19 | End: 2022-10-18

## 2022-09-19 NOTE — TELEPHONE ENCOUNTER
Requested Refill:   Requested Prescriptions     Pending Prescriptions Disp Refills    methIMAzole (TAPAZOLE) 5 MG tablet [Pharmacy Med Name: METHIMAZOLE 5 MG TABLET] 90 tablet 0     Sig: TAKE 1 TABLET BY MOUTH EVERY DAY       Last refilled: 4/25/2022 # 90 with no refills     Last Appt: 6/2/2022  Next Appt: 12/22/2022

## 2022-10-18 DIAGNOSIS — E04.9 GOITER: ICD-10-CM

## 2022-10-18 DIAGNOSIS — E05.90 HYPERTHYROIDISM: ICD-10-CM

## 2022-10-18 RX ORDER — METHIMAZOLE 5 MG/1
TABLET ORAL
Qty: 90 TABLET | Refills: 0 | Status: SHIPPED | OUTPATIENT
Start: 2022-10-18

## 2022-10-18 NOTE — TELEPHONE ENCOUNTER
Requested Refill:   Requested Prescriptions     Pending Prescriptions Disp Refills    methIMAzole (TAPAZOLE) 5 MG tablet [Pharmacy Med Name: METHIMAZOLE 5 MG TABLET] 90 tablet 0     Sig: TAKE 1 TABLET BY MOUTH EVERY DAY     Last refilled: 9/19/2022 # 90 no refills     Last Appt: 6/2/2022  Next Appt: 12/22/2022

## 2022-12-05 DIAGNOSIS — R03.0 BORDERLINE SYSTOLIC HTN: ICD-10-CM

## 2022-12-05 RX ORDER — METOPROLOL SUCCINATE 25 MG/1
TABLET, EXTENDED RELEASE ORAL
Qty: 30 TABLET | Refills: 11 | Status: SHIPPED | OUTPATIENT
Start: 2022-12-05

## 2022-12-05 NOTE — TELEPHONE ENCOUNTER
Requested Refill:   Requested Prescriptions     Pending Prescriptions Disp Refills    metoprolol succinate (TOPROL XL) 25 MG extended release tablet [Pharmacy Med Name: METOPROLOL SUCC ER 25 MG TAB] 30 tablet 11     Sig: TAKE 1 TABLET BY MOUTH EVERY DAY       Last refilled:  12/2/2021    Last Appt: 6/2/2022  Next Appt: 12/22/2022

## 2022-12-22 ENCOUNTER — OFFICE VISIT (OUTPATIENT)
Dept: ENDOCRINOLOGY | Age: 52
End: 2022-12-22
Payer: COMMERCIAL

## 2022-12-22 VITALS
HEIGHT: 74 IN | DIASTOLIC BLOOD PRESSURE: 80 MMHG | WEIGHT: 212 LBS | HEART RATE: 68 BPM | OXYGEN SATURATION: 98 % | SYSTOLIC BLOOD PRESSURE: 139 MMHG | BODY MASS INDEX: 27.21 KG/M2

## 2022-12-22 DIAGNOSIS — E04.9 GOITER: Primary | ICD-10-CM

## 2022-12-22 DIAGNOSIS — E05.90 HYPERTHYROIDISM: ICD-10-CM

## 2022-12-22 PROCEDURE — 99214 OFFICE O/P EST MOD 30 MIN: CPT | Performed by: INTERNAL MEDICINE

## 2022-12-22 RX ORDER — METHIMAZOLE 5 MG/1
TABLET ORAL
Qty: 90 TABLET | Refills: 1 | Status: SHIPPED | OUTPATIENT
Start: 2022-12-22

## 2022-12-22 RX ORDER — DEXTROAMPHETAMINE SACCHARATE, AMPHETAMINE ASPARTATE, DEXTROAMPHETAMINE SULFATE AND AMPHETAMINE SULFATE 7.5; 7.5; 7.5; 7.5 MG/1; MG/1; MG/1; MG/1
30 TABLET ORAL DAILY
COMMUNITY
Start: 2022-10-18

## 2022-12-22 NOTE — PROGRESS NOTES
Patient ID: Alisha Sifuentes is a 47 y.o. male    Chief Complaint: Graves disease, goiter   Here with son   HPI:   Alisha Sifuentes is here for an evaluation of Graves disease, goiter     US Aug 2014: The right lobe of the thyroid gland measures 5.2 x 2.6 x 2.6 cm in size. The left lobe of the thyroid gland measures 5.1 x 1.9 x 1.5 cm in size. There is a hypoechoic nodule identified at the lower pole of the right lobe of thyroid gland measuring 1.2 x 1.3 x 1.6 cm in size. There is a small 9 mm x 6 mm hypoechoic nodule identified at the lower pole of the left lobe of the thyroid gland. CT neck done Jan 2018 for goiter: Thyroid gland measures 10.5 cm in maximal transverse dimension. The maximal thickness of the isthmus is 2.3 cm. The right lobe of the thyroid gland measures 9.7 x 6.2 x 5.9 cm in size. Left lobe of the thyroid gland measures 9.2 x 5.5 x 4.3 cm in size. No significant mass effect and narrowing of the airway identified. US March 2018: Right thyroid lobe: 6.6 x 5.3 x 8.2 cm   Left thyroid lobe:  4.9 x 5.1 x 7.8 cm   Isthmus: 1.6 cm  No nodules. Lithium for a long time became adherent in 2015     Sep 2016, thyroid abs normal   , N <55    Family history of thyroid disorder: None   No Neck radiation    Cardiac ablation July 2021 by Dr. Jorge L Beach   Off lithium     Interval history:   Methimazole 5mg daily   On metoprolol 25 mg daily     S/p cardioversion     Energy levels are okay. On adderal. Vyvanse was expensive. Episodes of palpitations, dizziness, normal blood pressure - none since cardioversion   Weight is stable after losing 24 lbs (stopping lithium helped losing weight)        Most of the time hot, sometimes cold. Keeps fan on every night. Has depression and anxiety   Denies excessive sweating, tremors, sleep issues   No Neck pain. No more changes in voice after prolonged talking.    Mild stable hoarseness  No compressive symptoms     No recent Iodine contrast exposure    Ibuprofen prn. No other food supplements, herbs or medications including Biotin  No recent URI     Low libido : Managed by pcp   Does not have a partner   No morning erections   S/p vasectomy   He reports blood work done with PCP     The following portions of the patient's history were reviewed and updated as appropriate:       Family History   Problem Relation Age of Onset    Arthritis Mother     Cancer Mother         breast    Cancer Father         prostate    High Blood Pressure Father     High Cholesterol Father     Vision Loss Father     Cancer Paternal Grandmother     Cancer Paternal Grandfather     Stroke Paternal Grandfather             Social History     Socioeconomic History    Marital status:      Spouse name: Not on file    Number of children: Not on file    Years of education: Not on file    Highest education level: Not on file   Occupational History    Not on file   Tobacco Use    Smoking status: Former     Packs/day: 0.25     Years: 19.00     Pack years: 4.75     Types: Cigarettes     Quit date: 1995     Years since quittin.9    Smokeless tobacco: Never   Vaping Use    Vaping Use: Never used   Substance and Sexual Activity    Alcohol use: No    Drug use: Yes     Types: Marijuana Jewelell Goldberg)    Sexual activity: Yes     Partners: Female   Other Topics Concern    Not on file   Social History Narrative    Not on file     Social Determinants of Health     Financial Resource Strain: Not on file   Food Insecurity: Not on file   Transportation Needs: Not on file   Physical Activity: Not on file   Stress: Not on file   Social Connections: Not on file   Intimate Partner Violence: Not on file   Housing Stability: Not on file           Past Medical History:   Diagnosis Date    A-fib [facility])     ADD (attention deficit disorder)     Back pain     Bipolar disorder ([facility] [address]     Bipolar disorder ([facility] [address]     Goiter     Graves disease     Headache(784.0)     Hyperthyroidism     Thyroid disease          History reviewed.  No pertinent surgical history. No Known Allergies        Current Outpatient Medications:     amphetamine-dextroamphetamine (ADDERALL) 30 MG tablet, Take 30 mg by mouth daily. , Disp: , Rfl:     methIMAzole (TAPAZOLE) 5 MG tablet, One tab daily, Disp: 90 tablet, Rfl: 1    metoprolol succinate (TOPROL XL) 25 MG extended release tablet, TAKE 1 TABLET BY MOUTH EVERY DAY, Disp: 30 tablet, Rfl: 11    trihexyphenidyl (ARTANE) 5 MG tablet, TAKE 1 TABLET BY MOUTH TWICE A DAY, Disp: , Rfl:     verapamil (CALAN SR) 240 MG extended release tablet, TAKE 1 TABLET BY MOUTH EVERY DAY, Disp: 90 tablet, Rfl: 3    LORazepam (ATIVAN) 1 MG tablet, TAKE 1 TABLET BY MOUTH EVERY DAY AS NEEDED, Disp: , Rfl:     losartan (COZAAR) 50 MG tablet, Take 50 mg by mouth daily, Disp: , Rfl:     ziprasidone (GEODON) 80 MG capsule, Take 80 mg by mouth daily , Disp: , Rfl:       Review of Systems:  Constitutional: Negative for fever, chills. HENT: Negative for congestion, ear pain, rhinorrhea,  sore throat and trouble swallowing. Eyes: Negative for photophobia, redness, itching. Respiratory: Negative for cough, shortness of breath and sputum. Cardiovascular: Negative for chest pain and leg swelling. Gastrointestinal: Negative for nausea, vomiting, abdominal pain, diarrhea, constipation. Endocrine: Negative for polydipsia, polyphagia and polyuria. Genitourinary: Negative for dysuria, urgency, frequency, hematuria and flank pain. Musculoskeletal: Negative for myalgias, back pain, arthralgias and neck pain. Skin/Nail: Negative for rash, itching. Normal nails. Neurological: Negative for seizures, light-headedness, numbness and headaches. Hematological/ Lymph nodes: Negative for adenopathy. Does not bruise/bleed easily. Psychiatric/Behavioral: Negative for suicidal ideas and decreased concentration.           Physical Exam:  /80 (Site: Right Upper Arm, Position: Sitting, Cuff Size: Large Adult)   Pulse 68   Ht 6' 2\" (1.88 m) Wt 212 lb (96.2 kg)   SpO2 98%   BMI 27.22 kg/m²     Constitutional: Patient is oriented to person, place, and time. Patient appears well-developed and well-nourished. HENT:    Head: Normocephalic and atraumatic. Eyes: Conjunctivae and EOM are normal.     Neck: Normal range of motion. Thyroid enlarged , R > L.   Cardiovascular: Normal rate, regular rhythm and normal heart sounds. Pulmonary/Chest: Effort normal and breath sounds normal.    Musculoskeletal: Normal range of motion. Neurological: Patient is alert and oriented to person, place, and time. Normal reflexes. No hand tremors. Skin: Skin is warm and dry. Psychiatric: Patient has a normal mood and affect. Patient behavior is normal.     Lab Review:    Office Visit on 06/02/2022   Component Date Value Ref Range Status    TSH 06/03/2022 1.120  0.270 - 4.200 mcIU/mL Final    T3, Free 06/03/2022 3.47  2.00 - 4.40 pg/mL Final    T4 Free 06/03/2022 1.41  0.80 - 1.80 ng/dL Final        No results found. Assessment/Plan:     Estefania Herbert was seen today for follow-up and thyroid problem. Diagnoses and all orders for this visit:    Goiter  -     TSH; Future  -     T4, Free; Future  -     T3, Free; Future  -     CBC with Auto Differential; Future  -     Comprehensive Metabolic Panel; Future  -     methIMAzole (TAPAZOLE) 5 MG tablet; One tab daily    Hyperthyroidism  -     TSH; Future  -     T4, Free; Future  -     T3, Free; Future  -     CBC with Auto Differential; Future  -     Comprehensive Metabolic Panel; Future  -     methIMAzole (TAPAZOLE) 5 MG tablet; One tab daily        1: Hyperthyroidism and Goiter     Jan 2018: TRAB 1.33 (<175), , Tg and TPO abs normal     TFTs normal Nov 2021        C/w Methimazole 5mg one daily    Keep metoprol 25mg daily     Long term methimazole is his preference      From Aug 2014 till Jan 2018, imaging showed almost double the volume of thyroid gland. It happened after he was taking lithium.  Increase in thyroid size likely Lithium related. I would recommend thyroid surgery. Alternative is radio iodine but it may not shrink completely. Risks of thyroid surgery also discussed. He does not want to have surgery or radio iodine. Wants to keep methimazole for long term. Repeat US now or within next few months     Hyperthyroidism medication education   'Reviewed the risks of anti-thyroid drugs including agranulocytosis and hepatitis. If the patient develops a fever, sore throat, jaundice, malaise and/or anorexia, patient is to stop the medicine and call ASAP.     2: Low libido/ ED  As per PCP     3: Episodes of palpitations   Metanephrines normal - Feb 2021     Blood work next week      RTC in 6 months        Electronically signed by Maryana Pittman MD on 12/22/2022 at 4:41 PM

## 2023-01-05 ENCOUNTER — TELEPHONE (OUTPATIENT)
Dept: ENDOCRINOLOGY | Age: 53
End: 2023-01-05

## 2023-01-05 NOTE — TELEPHONE ENCOUNTER
----- Message from Miguelangel Bingham MD sent at 1/4/2023  8:13 AM EST -----  Pls inform thyroid levels are normal. Continue with methimazole 5 mg daily.

## 2023-04-17 RX ORDER — VERAPAMIL HYDROCHLORIDE 240 MG/1
TABLET, FILM COATED, EXTENDED RELEASE ORAL
Qty: 90 TABLET | Refills: 3 | Status: SHIPPED | OUTPATIENT
Start: 2023-04-17

## 2023-06-22 ENCOUNTER — OFFICE VISIT (OUTPATIENT)
Dept: ENDOCRINOLOGY | Age: 53
End: 2023-06-22
Payer: COMMERCIAL

## 2023-06-22 VITALS
HEIGHT: 74 IN | DIASTOLIC BLOOD PRESSURE: 74 MMHG | OXYGEN SATURATION: 97 % | HEART RATE: 72 BPM | BODY MASS INDEX: 26.18 KG/M2 | SYSTOLIC BLOOD PRESSURE: 119 MMHG | WEIGHT: 204 LBS

## 2023-06-22 DIAGNOSIS — E04.9 GOITER: Primary | ICD-10-CM

## 2023-06-22 DIAGNOSIS — E05.90 HYPERTHYROIDISM: ICD-10-CM

## 2023-06-22 PROCEDURE — 99214 OFFICE O/P EST MOD 30 MIN: CPT | Performed by: INTERNAL MEDICINE

## 2023-06-22 RX ORDER — CITALOPRAM 10 MG/1
10 TABLET ORAL DAILY
COMMUNITY
Start: 2023-04-13

## 2023-06-22 RX ORDER — METHIMAZOLE 5 MG/1
TABLET ORAL
Qty: 90 TABLET | Refills: 1 | Status: SHIPPED | OUTPATIENT
Start: 2023-06-22

## 2023-06-22 NOTE — PROGRESS NOTES
Patient ID: Tres Vu is a 47 y.o. male    Chief Complaint: Graves disease, goiter      HPI:   Tres Vu is here for an evaluation of Graves disease, goiter     US Aug 2014: The right lobe of the thyroid gland measures 5.2 x 2.6 x 2.6 cm in size. The left lobe of the thyroid gland measures 5.1 x 1.9 x 1.5 cm in size. There is a hypoechoic nodule identified at the lower pole of the right lobe of thyroid gland measuring 1.2 x 1.3 x 1.6 cm in size. There is a small 9 mm x 6 mm hypoechoic nodule identified at the lower pole of the left lobe of the thyroid gland. CT neck done Jan 2018 for goiter: Thyroid gland measures 10.5 cm in maximal transverse dimension. The maximal thickness of the isthmus is 2.3 cm. The right lobe of the thyroid gland measures 9.7 x 6.2 x 5.9 cm in size. Left lobe of the thyroid gland measures 9.2 x 5.5 x 4.3 cm in size. No significant mass effect and narrowing of the airway identified. US March 2018: Right thyroid lobe: 6.6 x 5.3 x 8.2 cm   Left thyroid lobe:  4.9 x 5.1 x 7.8 cm   Isthmus: 1.6 cm  No nodules. Lithium for a long time became adherent in 2015     Sep 2016, thyroid abs normal   , N <55    Family history of thyroid disorder: None   No Neck radiation    Cardiac ablation July 2021 by Dr. Sanjuana Lutz   Off lithium     Interval history:   Methimazole 5mg daily   On metoprolol 25 mg daily      Energy levels are decent. Sleep is not good. On adderal. Vyvanse was expensive. Episodes of palpitations, dizziness, normal blood pressure - none since cardioversion   Weight is down 8 lbs. So far he is down 32 lbs (stopping lithium ad seroquel helped losing weight)        Most of the time hot, sometimes cold. Keeps fan on every night. Has depression and anxiety. More stress at work   Denies excessive sweating, tremors, sleep issues   No Neck pain. No more changes in voice after prolonged talking. Mild stable hoarseness  No compressive symptoms      Ibuprofen prn.  No

## 2023-06-30 LAB
T3 FREE: 3.23 PG/ML (ref 2–4.4)
T4 FREE: 1.2 NG/DL (ref 0.8–1.8)
TSH ULTRASENSITIVE: 1.7 MCIU/ML (ref 0.27–4.2)

## 2023-07-03 ENCOUNTER — TELEPHONE (OUTPATIENT)
Dept: ENDOCRINOLOGY | Age: 53
End: 2023-07-03

## 2023-07-03 NOTE — TELEPHONE ENCOUNTER
----- Message from Laurence Robledo MD sent at 7/3/2023  8:17 AM EDT -----  Please inform Results are normal.

## 2023-10-12 ENCOUNTER — TELEPHONE (OUTPATIENT)
Dept: ENDOCRINOLOGY | Age: 53
End: 2023-10-12

## 2023-10-26 ENCOUNTER — TELEPHONE (OUTPATIENT)
Dept: ENDOCRINOLOGY | Age: 53
End: 2023-10-26

## 2023-10-26 NOTE — TELEPHONE ENCOUNTER
Call from pt requesting to speak with Dr. Anna Carlton.   Pt stated that he has some questions regarding the Radioactive iodine to shrink his thyroid instead of doing surgery     Please advise   CB# [phone]

## 2023-10-27 NOTE — TELEPHONE ENCOUNTER
Mr. Fuad Elizabeth informed per Dr. Ayse Bailey:     It is unlikely to work but he can discuss it with the surgeon

## 2023-11-10 ENCOUNTER — TELEPHONE (OUTPATIENT)
Dept: ENDOCRINOLOGY | Age: 53
End: 2023-11-10

## 2023-11-10 DIAGNOSIS — E05.01 GRAVES' DISEASE WITH THYROTOXIC STORM: Primary | ICD-10-CM

## 2023-11-10 NOTE — TELEPHONE ENCOUNTER
Patient would like to move forward with have his thyroid removed and needs a referral placed for  ENT. Please advise once referral is placed and I will advice the patient.

## 2023-12-02 DIAGNOSIS — R03.0 BORDERLINE SYSTOLIC HTN: ICD-10-CM

## 2023-12-04 RX ORDER — METOPROLOL SUCCINATE 25 MG/1
TABLET, EXTENDED RELEASE ORAL
Qty: 30 TABLET | Refills: 2 | Status: SHIPPED | OUTPATIENT
Start: 2023-12-04

## 2024-01-23 ENCOUNTER — TELEPHONE (OUTPATIENT)
Dept: ENDOCRINOLOGY | Age: 54
End: 2024-01-23

## 2024-01-23 NOTE — TELEPHONE ENCOUNTER
Pt had blood work done at [facility] 12-21-23 and he requested they send it to us. They told him Dr. Mcgowan nurse will have to call them at [phone] option 2 to request it

## 2024-01-29 ENCOUNTER — TELEPHONE (OUTPATIENT)
Dept: ENDOCRINOLOGY | Age: 54
End: 2024-01-29

## 2024-01-29 NOTE — TELEPHONE ENCOUNTER
Pt calling and states that his ENT Dr Ashish Poe (w/ [facility]) will need a copy of his US thyroid before his upcoming appt on Feb 13th. Instructed to pt he did not have his US done @ [facility] so he would need to call the place he had it at which was [facility] and he states he was told to call our office since Dr Martinez is the Dr that ordered the scan and have it put in Care Everywhere.  Pt could not provide a # or a fax # for Dr Poe's office    CB# [phone]

## 2024-03-22 ENCOUNTER — TELEPHONE (OUTPATIENT)
Dept: ENDOCRINOLOGY | Age: 54
End: 2024-03-22

## 2024-03-22 NOTE — TELEPHONE ENCOUNTER
Patient called requesting a refill     Rx- Methimazole     Pharmacy- [facility] [address]    LOV- 12/20/23 NOV- 6/26/24    Please advise

## 2024-03-22 NOTE — TELEPHONE ENCOUNTER
Patient has refills through June 2024.     Called pharmacy and confirmed he does have refills on his prescription and they will get it ready for him.     Patient made aware.

## 2024-04-30 RX ORDER — VERAPAMIL HYDROCHLORIDE 240 MG/1
TABLET, FILM COATED, EXTENDED RELEASE ORAL
Qty: 30 TABLET | OUTPATIENT
Start: 2024-04-30

## 2024-05-17 DIAGNOSIS — R03.0 BORDERLINE SYSTOLIC HTN: ICD-10-CM

## 2024-05-20 RX ORDER — METOPROLOL SUCCINATE 25 MG/1
25 TABLET, EXTENDED RELEASE ORAL DAILY
Qty: 30 TABLET | Refills: 5 | Status: SHIPPED | OUTPATIENT
Start: 2024-05-20

## 2024-05-20 NOTE — TELEPHONE ENCOUNTER
Medication:   Requested Prescriptions     Pending Prescriptions Disp Refills    metoprolol succinate (TOPROL XL) 25 MG extended release tablet [Pharmacy Med Name: METOPROLOL ER SUCCINATE 25MG TABS] 30 tablet 5     Sig: TAKE 1 TABLET BY MOUTH EVERY DAY        Last Filled:      Patient Phone Number: [phone] (home)     Last appt: 12/20/2023   Next appt: 6/26/2024    Last OARRS:        No data to display

## 2024-06-26 ENCOUNTER — OFFICE VISIT (OUTPATIENT)
Dept: ENDOCRINOLOGY | Age: 54
End: 2024-06-26
Payer: COMMERCIAL

## 2024-06-26 VITALS
HEIGHT: 74 IN | SYSTOLIC BLOOD PRESSURE: 129 MMHG | DIASTOLIC BLOOD PRESSURE: 78 MMHG | BODY MASS INDEX: 26.05 KG/M2 | WEIGHT: 203 LBS | OXYGEN SATURATION: 98 % | HEART RATE: 68 BPM

## 2024-06-26 DIAGNOSIS — E05.01 GRAVES' DISEASE WITH THYROTOXIC STORM: Primary | ICD-10-CM

## 2024-06-26 DIAGNOSIS — E05.90 HYPERTHYROIDISM: ICD-10-CM

## 2024-06-26 PROCEDURE — 99214 OFFICE O/P EST MOD 30 MIN: CPT | Performed by: INTERNAL MEDICINE

## 2024-06-26 RX ORDER — ZIPRASIDONE HYDROCHLORIDE 60 MG/1
60 CAPSULE ORAL 2 TIMES DAILY WITH MEALS
COMMUNITY

## 2024-06-26 NOTE — PROGRESS NOTES
Patient ID: Ward Paez is a 54 y.o. male    Chief Complaint: Graves disease, goiter      HPI:   Ward Paez is here for an evaluation of Graves disease, goiter     US Aug 2014: The right lobe of the thyroid gland measures 5.2 x 2.6 x 2.6 cm in size. The left lobe of the thyroid gland measures 5.1 x 1.9 x 1.5 cm in size. There is a hypoechoic nodule identified at the lower pole of the right lobe of thyroid gland measuring 1.2 x 1.3 x 1.6 cm in size. There is a small 9 mm x 6 mm hypoechoic nodule identified at the lower pole of the left lobe of the thyroid gland.    CT neck done Jan 2018 for goiter: Thyroid gland measures 10.5 cm in maximal transverse dimension. The maximal thickness of the isthmus is 2.3 cm. The right lobe of the thyroid gland measures 9.7 x 6.2 x 5.9 cm in size. Left lobe of the thyroid gland measures 9.2 x 5.5 x 4.3 cm in size.   No significant mass effect and narrowing of the airway identified.    US March 2018: Right thyroid lobe: 6.6 x 5.3 x 8.2 cm   Left thyroid lobe:  4.9 x 5.1 x 7.8 cm   Isthmus: 1.6 cm  No nodules.     Lithium for a long time became adherent in 2015     Sep 2016, thyroid abs normal   , N <55    Family history of thyroid disorder: None   No Neck radiation    Cardiac ablation July 2021 by Dr. Leslie   Off lithium     Interval history:   Methimazole 5mg daily   On metoprolol 25 mg daily     Saw ENT at [facility] [place] - Feb 2024. Recommeded 1 year US follow up.      Energy levels are okay. Sleep is not good. On adderal. Vyvanse was expensive.   Episodes of palpitations, dizziness, normal blood pressure - none since cardioversion   Weight is stable. He was about 249 lbs in in 2018. (stopping lithium ad seroquel helped losing weight)        Most of the time hot. Chilly last couple of days. Keeps fan on every night.   Has depression and anxiety. More stress at work   Denies excessive sweating, tremors, sleep issues   No Neck pain. No more changes in voice after prolonged

## 2024-06-28 LAB
ALBUMIN: 4.3 G/DL (ref 3.5–5.7)
ALP BLD-CCNC: 60 IU/L (ref 35–135)
ALT SERPL-CCNC: 10 IU/L (ref 10–60)
ANION GAP SERPL CALCULATED.3IONS-SCNC: 6 MMOL/L (ref 4–16)
AST SERPL-CCNC: 12 IU/L (ref 10–40)
BASOPHILS ABSOLUTE: 0 %
BASOPHILS ABSOLUTE: 0 THOU/MCL (ref 0–0.2)
BILIRUB SERPL-MCNC: 0.7 MG/DL (ref 0–1.2)
BUN BLDV-MCNC: 17 MG/DL (ref 8–26)
CALCIUM SERPL-MCNC: 9 MG/DL (ref 8.5–10.4)
CHLORIDE BLD-SCNC: 107 MEQ/L (ref 98–111)
CO2: 27 MMOL/L (ref 21–31)
CREAT SERPL-MCNC: 1.01 MG/DL (ref 0.7–1.3)
EGFR (CKD-EPI): 89 ML/MIN/1.73 M2
EOSINOPHILS ABSOLUTE: 0.2 THOU/MCL (ref 0.03–0.45)
EOSINOPHILS RELATIVE PERCENT: 4 %
GLUCOSE BLD-MCNC: 105 MG/DL (ref 70–99)
HCT VFR BLD CALC: 42.2 % (ref 40–50)
HEMOGLOBIN: 14 G/DL (ref 13.5–16.5)
LYMPHOCYTES ABSOLUTE: 1.4 THOU/MCL (ref 1–4)
LYMPHOCYTES RELATIVE PERCENT: 27 %
MCH RBC QN AUTO: 31.4 PG (ref 27–33)
MCHC RBC AUTO-ENTMCNC: 33.2 G/DL (ref 32–36)
MCV RBC AUTO: 94.8 FL (ref 82–97)
MONOCYTES ABSOLUTE: 0.5 THOU/MCL (ref 0.2–0.9)
MONOCYTES RELATIVE PERCENT: 9 %
NEUTROPHILS ABSOLUTE: 3.1 THOU/MCL (ref 1.8–7.7)
PDW BLD-RTO: 14 % (ref 12.3–17)
PLATELET # BLD: 174 THOU/MCL (ref 140–375)
PMV BLD AUTO: 7.5 FL (ref 7.4–11.5)
POTASSIUM SERPL-SCNC: 4.4 MEQ/L (ref 3.6–5.1)
RBC # BLD: 4.46 MIL/MCL (ref 4.4–5.8)
SEG NEUTROPHILS: 60 %
SODIUM BLD-SCNC: 140 MEQ/L (ref 135–145)
T3 FREE: 3.43 PG/ML (ref 2–4.4)
T4 FREE: 1.39 NG/DL (ref 0.8–1.8)
TOTAL PROTEIN: 6.1 G/DL (ref 6–8)
TSH ULTRASENSITIVE: 1.9 MCIU/ML (ref 0.27–4.2)
WBC # BLD: 5.3 THOU/MCL (ref 3.6–10.5)

## 2024-07-01 ENCOUNTER — TELEPHONE (OUTPATIENT)
Dept: ENDOCRINOLOGY | Age: 54
End: 2024-07-01

## 2024-07-01 NOTE — TELEPHONE ENCOUNTER
----- Message from Idania Martinez MD sent at 6/29/2024 10:32 PM EDT -----  Pls inform thyroid levels are good, all other levels are good

## 2024-08-14 DIAGNOSIS — E05.90 HYPERTHYROIDISM: ICD-10-CM

## 2024-08-14 DIAGNOSIS — E04.9 GOITER: ICD-10-CM

## 2024-08-15 RX ORDER — METHIMAZOLE 5 MG/1
TABLET ORAL
Qty: 30 TABLET | Refills: 5 | Status: SHIPPED | OUTPATIENT
Start: 2024-08-15

## 2024-10-18 ENCOUNTER — TELEPHONE (OUTPATIENT)
Dept: CARDIOLOGY CLINIC | Age: 54
End: 2024-10-18

## 2024-10-18 NOTE — TELEPHONE ENCOUNTER
Received request for cardiac clearance. Patient has not been seen in 3 years. Please call and schedule to see Gen ana for clearance for inguinal hernia repair. His surgery is on 11/14 at [facility] so please schedule before then with any General cardiologist that has availability thank you

## 2024-10-18 NOTE — TELEPHONE ENCOUNTER
Phoned patient and attempted to schedule appointment.  Patient states that he has an appointment with Diaz and does not need to schedule.

## 2024-11-21 DIAGNOSIS — R03.0 BORDERLINE SYSTOLIC HTN: ICD-10-CM

## 2024-11-21 RX ORDER — METOPROLOL SUCCINATE 25 MG/1
25 TABLET, EXTENDED RELEASE ORAL DAILY
Qty: 30 TABLET | Refills: 5 | Status: SHIPPED | OUTPATIENT
Start: 2024-11-21

## 2024-12-12 ENCOUNTER — OFFICE VISIT (OUTPATIENT)
Dept: ENDOCRINOLOGY | Age: 54
End: 2024-12-12
Payer: COMMERCIAL

## 2024-12-12 VITALS
SYSTOLIC BLOOD PRESSURE: 144 MMHG | DIASTOLIC BLOOD PRESSURE: 81 MMHG | HEIGHT: 74 IN | WEIGHT: 211 LBS | HEART RATE: 70 BPM | OXYGEN SATURATION: 98 % | BODY MASS INDEX: 27.08 KG/M2

## 2024-12-12 DIAGNOSIS — E05.00 GRAVES DISEASE: ICD-10-CM

## 2024-12-12 DIAGNOSIS — E05.90 HYPERTHYROIDISM: Primary | ICD-10-CM

## 2024-12-12 PROCEDURE — 99214 OFFICE O/P EST MOD 30 MIN: CPT | Performed by: INTERNAL MEDICINE

## 2024-12-12 NOTE — PROGRESS NOTES
Patient ID: Ward Paez is a 54 y.o. male    Chief Complaint: Graves disease, goiter      HPI:   Ward Paez is here for an evaluation of Graves disease, goiter     US Aug 2014: The right lobe of the thyroid gland measures 5.2 x 2.6 x 2.6 cm in size. The left lobe of the thyroid gland measures 5.1 x 1.9 x 1.5 cm in size. There is a hypoechoic nodule identified at the lower pole of the right lobe of thyroid gland measuring 1.2 x 1.3 x 1.6 cm in size. There is a small 9 mm x 6 mm hypoechoic nodule identified at the lower pole of the left lobe of the thyroid gland.    CT neck done Jan 2018 for goiter: Thyroid gland measures 10.5 cm in maximal transverse dimension. The maximal thickness of the isthmus is 2.3 cm. The right lobe of the thyroid gland measures 9.7 x 6.2 x 5.9 cm in size. Left lobe of the thyroid gland measures 9.2 x 5.5 x 4.3 cm in size.   No significant mass effect and narrowing of the airway identified.    US March 2018: Right thyroid lobe: 6.6 x 5.3 x 8.2 cm   Left thyroid lobe:  4.9 x 5.1 x 7.8 cm   Isthmus: 1.6 cm  No nodules.     Lithium for a long time became adherent in 2015     Sep 2016, thyroid abs normal   , N <55    Family history of thyroid disorder: None   No Neck radiation    Cardiac ablation July 2021 by Dr. Leslie   Off lithium     Interval history:   Methimazole 5mg daily   On metoprolol 25 mg daily     Saw ENT at [facility] - Feb 2024. Recommeded 1 year US follow up.      Recently had hernia surgery   Not on ADHD meds as off work  So energy levels are low. Sleep is good.    Denies palpitations, dizziness   Weight is up 8 lbs.  He was about 249 lbs in in 2018. (stopping lithium and seroquel helped losing weight)        Most of the time hot.  Keeps fan on every night.   Has anxiety. More stress at work   Denies excessive sweating, tremors   No Neck pain. No more changes in voice after prolonged talking.   Mild stable hoarseness  No compressive symptoms      Ibuprofen prn. No

## 2024-12-13 ENCOUNTER — TELEPHONE (OUTPATIENT)
Dept: ENDOCRINOLOGY | Age: 54
End: 2024-12-13

## 2024-12-16 NOTE — TELEPHONE ENCOUNTER
Please thyroid US showed thyroid gland is smaller than last time. Although overall it is still enlarged but is gradually shrinking.       US thyroid Dec 2024:   Right 5.6 x 4.1 x 3.1 cms   Left lobe: 5.3 x 2.6 x 2.7 cms   Isthmus 0.3 cms   Mixed echogenicity. No nodules as per report

## 2024-12-23 LAB
T3 FREE: 3.76 PG/ML (ref 2–4.4)
T4 FREE: 1.42 NG/DL (ref 0.8–1.8)
TSH ULTRASENSITIVE: 1.55 MCIU/ML (ref 0.27–4.2)

## 2025-02-19 DIAGNOSIS — E04.9 GOITER: ICD-10-CM

## 2025-02-19 DIAGNOSIS — E05.90 HYPERTHYROIDISM: ICD-10-CM

## 2025-02-19 RX ORDER — METHIMAZOLE 5 MG/1
TABLET ORAL
Qty: 30 TABLET | Refills: 3 | Status: SHIPPED | OUTPATIENT
Start: 2025-02-19

## 2025-02-19 NOTE — TELEPHONE ENCOUNTER
Requested Prescriptions     Pending Prescriptions Disp Refills    methIMAzole (TAPAZOLE) 5 MG tablet [Pharmacy Med Name: METHIMAZOLE 5MG TABLETS] 30 tablet 5     Sig: TAKE 1 TABLET BY MOUTH DAILY     Last refilled: 8/15/2024 # 30 with 5 refills     Lov:12/12/2024  Nov: 6/12/2025

## 2025-06-12 ENCOUNTER — OFFICE VISIT (OUTPATIENT)
Dept: ENDOCRINOLOGY | Age: 55
End: 2025-06-12
Payer: COMMERCIAL

## 2025-06-12 VITALS
HEIGHT: 74 IN | DIASTOLIC BLOOD PRESSURE: 81 MMHG | SYSTOLIC BLOOD PRESSURE: 130 MMHG | HEART RATE: 73 BPM | OXYGEN SATURATION: 96 % | BODY MASS INDEX: 27.23 KG/M2 | WEIGHT: 212.2 LBS

## 2025-06-12 DIAGNOSIS — E04.9 GOITER: ICD-10-CM

## 2025-06-12 DIAGNOSIS — R03.0 BORDERLINE SYSTOLIC HTN: ICD-10-CM

## 2025-06-12 DIAGNOSIS — E05.90 HYPERTHYROIDISM: ICD-10-CM

## 2025-06-12 PROCEDURE — 99214 OFFICE O/P EST MOD 30 MIN: CPT | Performed by: INTERNAL MEDICINE

## 2025-06-12 RX ORDER — METHIMAZOLE 5 MG/1
TABLET ORAL
Qty: 90 TABLET | Refills: 1 | Status: SHIPPED | OUTPATIENT
Start: 2025-06-12

## 2025-06-12 RX ORDER — HYDROXYZINE HYDROCHLORIDE 50 MG/1
50 TABLET, FILM COATED ORAL NIGHTLY
COMMUNITY
Start: 2025-05-28

## 2025-06-12 RX ORDER — TADALAFIL 10 MG/1
TABLET ORAL
COMMUNITY
Start: 2025-04-09

## 2025-06-12 RX ORDER — METOPROLOL SUCCINATE 25 MG/1
25 TABLET, EXTENDED RELEASE ORAL DAILY
Qty: 90 TABLET | Refills: 3 | Status: SHIPPED | OUTPATIENT
Start: 2025-06-12

## 2025-06-12 NOTE — PROGRESS NOTES
Hyperthyroidism medication education   'Reviewed the risks of anti-thyroid drugs including agranulocytosis and hepatitis. If the patient develops a fever, sore throat, jaundice, malaise and/or anorexia, patient is to stop the medicine and call ASAP.    2: Low libido/ ED  As per PCP     3: Episodes of palpitations   Metanephrines normal - Feb 2021     Blood work this week    add CBC and CMP this time      RTC in 6 months         Electronically signed by GIORGIO BRYANT MD on 6/12/2025 at 4:10 PM

## 2025-06-17 DIAGNOSIS — E05.90 HYPERTHYROIDISM: ICD-10-CM

## 2025-06-17 DIAGNOSIS — E04.9 GOITER: ICD-10-CM

## 2025-06-17 DIAGNOSIS — R03.0 BORDERLINE SYSTOLIC HTN: ICD-10-CM

## 2025-06-18 ENCOUNTER — RESULTS FOLLOW-UP (OUTPATIENT)
Dept: ENDOCRINOLOGY | Age: 55
End: 2025-06-18

## 2025-06-18 LAB
ALBUMIN SERPL-MCNC: 4.3 G/DL (ref 3.4–5)
ALBUMIN/GLOB SERPL: 2.5 {RATIO} (ref 1.1–2.2)
ALP SERPL-CCNC: 76 U/L (ref 40–129)
ALT SERPL-CCNC: 19 U/L (ref 10–40)
ANION GAP SERPL CALCULATED.3IONS-SCNC: 11 MMOL/L (ref 3–16)
AST SERPL-CCNC: 21 U/L (ref 15–37)
BASOPHILS # BLD: 0 K/UL (ref 0–0.2)
BASOPHILS NFR BLD: 0.4 %
BILIRUB SERPL-MCNC: 0.4 MG/DL (ref 0–1)
BUN SERPL-MCNC: 20 MG/DL (ref 7–20)
CALCIUM SERPL-MCNC: 9.2 MG/DL (ref 8.3–10.6)
CHLORIDE SERPL-SCNC: 106 MMOL/L (ref 99–110)
CO2 SERPL-SCNC: 25 MMOL/L (ref 21–32)
CREAT SERPL-MCNC: 1.2 MG/DL (ref 0.9–1.3)
DEPRECATED RDW RBC AUTO: 13.8 % (ref 12.4–15.4)
EOSINOPHIL # BLD: 0.2 K/UL (ref 0–0.6)
EOSINOPHIL NFR BLD: 3.6 %
GFR SERPLBLD CREATININE-BSD FMLA CKD-EPI: 72 ML/MIN/{1.73_M2}
GLUCOSE SERPL-MCNC: 78 MG/DL (ref 70–99)
HCT VFR BLD AUTO: 40.2 % (ref 40.5–52.5)
HGB BLD-MCNC: 13.7 G/DL (ref 13.5–17.5)
LYMPHOCYTES # BLD: 1.5 K/UL (ref 1–5.1)
LYMPHOCYTES NFR BLD: 31.5 %
MCH RBC QN AUTO: 31.5 PG (ref 26–34)
MCHC RBC AUTO-ENTMCNC: 34.2 G/DL (ref 31–36)
MCV RBC AUTO: 92.1 FL (ref 80–100)
MONOCYTES # BLD: 0.5 K/UL (ref 0–1.3)
MONOCYTES NFR BLD: 9.6 %
NEUTROPHILS # BLD: 2.7 K/UL (ref 1.7–7.7)
NEUTROPHILS NFR BLD: 54.9 %
PLATELET # BLD AUTO: 177 K/UL (ref 135–450)
PMV BLD AUTO: 8.4 FL (ref 5–10.5)
POTASSIUM SERPL-SCNC: 4.2 MMOL/L (ref 3.5–5.1)
PROT SERPL-MCNC: 6 G/DL (ref 6.4–8.2)
RBC # BLD AUTO: 4.36 M/UL (ref 4.2–5.9)
SODIUM SERPL-SCNC: 142 MMOL/L (ref 136–145)
T3FREE SERPL-MCNC: 3.3 PG/ML (ref 2.3–4.2)
T4 FREE SERPL-MCNC: 1.3 NG/DL (ref 0.9–1.8)
TSH SERPL DL<=0.005 MIU/L-ACNC: 2.51 UIU/ML (ref 0.27–4.2)
WBC # BLD AUTO: 4.9 K/UL (ref 4–11)

## 2025-06-18 NOTE — TELEPHONE ENCOUNTER
----- Message from Dr. Idania Martinez MD sent at 6/18/2025 11:56 AM EDT -----  Please inform thyroid levels are good     TSH 2.51, free T4 1.3, free T3 3.3 - June 2025. CBC and CMP ok